# Patient Record
Sex: MALE | Race: BLACK OR AFRICAN AMERICAN | Employment: FULL TIME | URBAN - METROPOLITAN AREA
[De-identification: names, ages, dates, MRNs, and addresses within clinical notes are randomized per-mention and may not be internally consistent; named-entity substitution may affect disease eponyms.]

---

## 2018-07-09 ENCOUNTER — HOSPITAL ENCOUNTER (EMERGENCY)
Age: 33
Discharge: HOME | End: 2018-07-09
Payer: MEDICAID

## 2018-07-09 VITALS
DIASTOLIC BLOOD PRESSURE: 66 MMHG | BODY MASS INDEX: 20.28 KG/M2 | SYSTOLIC BLOOD PRESSURE: 114 MMHG | OXYGEN SATURATION: 97 % | WEIGHT: 158 LBS | RESPIRATION RATE: 18 BRPM | TEMPERATURE: 97 F | HEIGHT: 74 IN | HEART RATE: 78 BPM

## 2018-07-09 DIAGNOSIS — L02.412 ABSCESSES OF BOTH AXILLAE: Primary | ICD-10-CM

## 2018-07-09 DIAGNOSIS — L02.411 ABSCESSES OF BOTH AXILLAE: Primary | ICD-10-CM

## 2018-07-09 PROCEDURE — 99282 EMERGENCY DEPT VISIT SF MDM: CPT

## 2018-07-09 RX ORDER — NAPROXEN 500 MG/1
500 TABLET ORAL 2 TIMES DAILY
Qty: 20 TABLET | Refills: 0 | Status: SHIPPED | OUTPATIENT
Start: 2018-07-09 | End: 2019-11-24

## 2018-07-09 RX ORDER — DOXYCYCLINE HYCLATE 100 MG
100 TABLET ORAL 2 TIMES DAILY
Qty: 20 TABLET | Refills: 0 | Status: SHIPPED | OUTPATIENT
Start: 2018-07-09 | End: 2018-07-19

## 2018-07-09 ASSESSMENT — PAIN DESCRIPTION - ORIENTATION: ORIENTATION: OTHER (COMMENT)

## 2018-07-09 ASSESSMENT — PAIN DESCRIPTION - LOCATION: LOCATION: ARM

## 2018-07-09 ASSESSMENT — PAIN SCALES - GENERAL: PAINLEVEL_OUTOF10: 4

## 2018-07-09 ASSESSMENT — PAIN DESCRIPTION - PAIN TYPE: TYPE: ACUTE PAIN

## 2018-07-09 NOTE — ED PROVIDER NOTES
erythematous, swollen, or hot to touch. No drainage. Neurovascular: Motor deficit: none. Sensory deficit: none. Pulse deficit: none. Capillary refill: normal.  Lymphatics: No lymphangitis or adenopathy noted. Neurological:  Oriented. Motor functions intact. Lab / Imaging Results   (All laboratory and radiology results have been personally reviewed by myself)  Labs:  No results found for this visit on 07/09/18. Imaging: All Radiology results interpreted by Radiologist unless otherwise noted. No orders to display     ED Course / Medical Decision Making   Medications - No data to display     Re-examination:  7/9/18       Time: 1830   Patients symptoms are the same. Consult(s):   None    Procedure(s):   none    MDM:      Patient presents to the emergency department for multiple abscesses of the bilateral axillary. The patient was given the option to have some of the abscesses drained in the emergency department, but due to the small and nonfluctuant nature of the remaining abscesses I explained to him that it would not be in his best interest to have all of them incised. The patient adamantly refused to only have some, but not all, of his abscesses taken care of. He will, therefore, be given the contact information for Dr. Che Pinedo, general surgery, whom he should follow up with. Patient's vital signs are stable. He denies any pain or additional symptoms other than the \"fucking boils\" under his armpits. He'll be given the prescriptions listed below and should follow up with general surgery. Specific conditions for emergent return have been discussed and the patient verbalized understanding to return immediately for new or worsening symptoms. .    Counseling:        The emergency provider has spoken with the patient and discussed todays results, in addition to providing specific details for the plan of care and counseling regarding the diagnosis and prognosis. Questions are answered at this time and they are agreeable with the plan. Assessment      1. Abscesses of both axillae      Plan   Discharge to home  Patient condition is good    New Medications     Discharge Medication List as of 7/9/2018  6:37 PM      START taking these medications    Details   doxycycline hyclate (VIBRA-TABS) 100 MG tablet Take 1 tablet by mouth 2 times daily for 10 days, Disp-20 tablet, R-0Print      naproxen (NAPROSYN) 500 MG tablet Take 1 tablet by mouth 2 times daily for 10 days, Disp-20 tablet, R-0Print           Electronically signed by Armando Mccurdy PA-C   DD: 7/9/18  **This report was transcribed using voice recognition software. Every effort was made to ensure accuracy; however, inadvertent computerized transcription errors may be present.   END OF ED PROVIDER NOTE      Armando Mccurdy PA-C  07/09/18 [id number]

## 2019-11-24 ENCOUNTER — HOSPITAL ENCOUNTER (EMERGENCY)
Age: 34
Discharge: HOME | End: 2019-11-24
Attending: FAMILY MEDICINE

## 2019-11-24 ENCOUNTER — APPOINTMENT (OUTPATIENT)
Dept: GENERAL RADIOLOGY | Age: 34
End: 2019-11-24

## 2019-11-24 VITALS
SYSTOLIC BLOOD PRESSURE: 136 MMHG | WEIGHT: 163 LBS | RESPIRATION RATE: 18 BRPM | HEART RATE: 87 BPM | TEMPERATURE: 98.3 F | HEIGHT: 74 IN | OXYGEN SATURATION: 95 % | BODY MASS INDEX: 20.92 KG/M2 | DIASTOLIC BLOOD PRESSURE: 88 MMHG

## 2019-11-24 DIAGNOSIS — J06.9 VIRAL URI WITH COUGH: Primary | ICD-10-CM

## 2019-11-24 LAB
INFLUENZA A BY PCR: NOT DETECTED
INFLUENZA B BY PCR: NOT DETECTED

## 2019-11-24 PROCEDURE — 6370000000 HC RX 637 (ALT 250 FOR IP)

## 2019-11-24 PROCEDURE — 71046 X-RAY EXAM CHEST 2 VIEWS: CPT

## 2019-11-24 PROCEDURE — 99283 EMERGENCY DEPT VISIT LOW MDM: CPT

## 2019-11-24 PROCEDURE — 87502 INFLUENZA DNA AMP PROBE: CPT

## 2019-11-24 RX ORDER — ONDANSETRON 4 MG/1
4 TABLET, ORALLY DISINTEGRATING ORAL ONCE
Status: COMPLETED | OUTPATIENT
Start: 2019-11-24 | End: 2019-11-24

## 2019-11-24 RX ORDER — BROMPHENIRAMINE MALEATE, PSEUDOEPHEDRINE HYDROCHLORIDE, AND DEXTROMETHORPHAN HYDROBROMIDE 2; 30; 10 MG/5ML; MG/5ML; MG/5ML
5 SYRUP ORAL 4 TIMES DAILY PRN
Qty: 120 ML | Refills: 0 | Status: SHIPPED | OUTPATIENT
Start: 2019-11-24 | End: 2021-12-02

## 2019-11-24 RX ORDER — ONDANSETRON 4 MG/1
TABLET, ORALLY DISINTEGRATING ORAL
Status: COMPLETED
Start: 2019-11-24 | End: 2019-11-24

## 2019-11-24 RX ORDER — ONDANSETRON 4 MG/1
4 TABLET, ORALLY DISINTEGRATING ORAL EVERY 8 HOURS PRN
Qty: 10 TABLET | Refills: 0 | Status: SHIPPED | OUTPATIENT
Start: 2019-11-24 | End: 2020-11-23

## 2019-11-24 RX ADMIN — ONDANSETRON 4 MG: 4 TABLET, ORALLY DISINTEGRATING ORAL at 08:47

## 2019-12-02 ENCOUNTER — HOSPITAL ENCOUNTER (EMERGENCY)
Age: 34
Discharge: HOME | End: 2019-12-02
Attending: EMERGENCY MEDICINE

## 2019-12-02 VITALS
RESPIRATION RATE: 18 BRPM | BODY MASS INDEX: 21.17 KG/M2 | HEART RATE: 71 BPM | HEIGHT: 74 IN | OXYGEN SATURATION: 99 % | TEMPERATURE: 97.9 F | WEIGHT: 165 LBS | SYSTOLIC BLOOD PRESSURE: 116 MMHG | DIASTOLIC BLOOD PRESSURE: 82 MMHG

## 2019-12-02 DIAGNOSIS — R53.83 OTHER FATIGUE: Primary | ICD-10-CM

## 2019-12-02 PROCEDURE — 99283 EMERGENCY DEPT VISIT LOW MDM: CPT

## 2021-02-24 ENCOUNTER — HOSPITAL ENCOUNTER (EMERGENCY)
Age: 36
Discharge: HOME | End: 2021-02-24

## 2021-02-24 VITALS
BODY MASS INDEX: 20.53 KG/M2 | HEART RATE: 82 BPM | TEMPERATURE: 97 F | WEIGHT: 160 LBS | HEIGHT: 74 IN | DIASTOLIC BLOOD PRESSURE: 83 MMHG | RESPIRATION RATE: 16 BRPM | OXYGEN SATURATION: 98 % | SYSTOLIC BLOOD PRESSURE: 132 MMHG

## 2021-02-24 DIAGNOSIS — L03.211 FACIAL CELLULITIS: Primary | ICD-10-CM

## 2021-02-24 PROCEDURE — 99284 EMERGENCY DEPT VISIT MOD MDM: CPT

## 2021-02-24 PROCEDURE — 90471 IMMUNIZATION ADMIN: CPT | Performed by: NURSE PRACTITIONER

## 2021-02-24 PROCEDURE — 90715 TDAP VACCINE 7 YRS/> IM: CPT | Performed by: NURSE PRACTITIONER

## 2021-02-24 PROCEDURE — 6360000002 HC RX W HCPCS: Performed by: NURSE PRACTITIONER

## 2021-02-24 PROCEDURE — 6370000000 HC RX 637 (ALT 250 FOR IP): Performed by: NURSE PRACTITIONER

## 2021-02-24 RX ORDER — IBUPROFEN 800 MG/1
800 TABLET ORAL EVERY 8 HOURS PRN
Qty: 30 TABLET | Refills: 0 | Status: SHIPPED | OUTPATIENT
Start: 2021-02-24 | End: 2021-12-02

## 2021-02-24 RX ORDER — DOXYCYCLINE HYCLATE 100 MG
100 TABLET ORAL 2 TIMES DAILY
Qty: 20 TABLET | Refills: 0 | Status: SHIPPED | OUTPATIENT
Start: 2021-02-24 | End: 2021-03-06

## 2021-02-24 RX ORDER — IBUPROFEN 800 MG/1
800 TABLET ORAL ONCE
Status: COMPLETED | OUTPATIENT
Start: 2021-02-24 | End: 2021-02-24

## 2021-02-24 RX ORDER — DOXYCYCLINE HYCLATE 100 MG/1
100 CAPSULE ORAL ONCE
Status: COMPLETED | OUTPATIENT
Start: 2021-02-24 | End: 2021-02-24

## 2021-02-24 RX ADMIN — TETANUS TOXOID, REDUCED DIPHTHERIA TOXOID AND ACELLULAR PERTUSSIS VACCINE, ADSORBED 0.5 ML: 5; 2.5; 8; 8; 2.5 SUSPENSION INTRAMUSCULAR at 15:12

## 2021-02-24 RX ADMIN — DOXYCYCLINE HYCLATE 100 MG: 100 CAPSULE ORAL at 15:12

## 2021-02-24 RX ADMIN — IBUPROFEN 800 MG: 800 TABLET, FILM COATED ORAL at 15:12

## 2021-02-25 NOTE — ED PROVIDER NOTES
[address]  Department of Emergency Medicine   ED  Encounter Note  Admit Date/RoomTime: 2021  3:03 PM  ED Room: [facility]    NAME: Ken Crowell  : 1985  MRN: [id number]     Chief Complaint:  Facial Swelling (R sided facial swelling, feels like he got bit by something 2 days ago, denies any issues swallowing, denies fever)    History of Present Illness        Ken Crowell is a 38 y.o. old male who presents to the emergency department by private vehicle, for non-traumatic right face pain which occured 2 day(s) prior to arrival.  Mechanism of pain/injury: possible insect bite, and is associated with surrounding facial swelling. Loss of consciousness: No.  Previous facial injury: no.  Since onset the symptoms have been stable. His pain is aggraveated by palpation and relieved by nothing. There has been no fever or chills, difficulty breathing, swallowing or visual disturbance or decrease ROM of eye. He takes no blood thinning agents. Last tetanus unknown. ROS   Pertinent positives and negatives are stated within HPI, all other systems reviewed and are negative. Past Medical History:  has no past medical history on file. Surgical History:  has a past surgical history that includes Eye surgery and Hemorrhoid surgery. Social History:  reports that he has been smoking cigarettes. He has been smoking about 1.00 pack per day. He has never used smokeless tobacco. He reports current alcohol use. He reports current drug use. Drug: Marijuana. Family History: family history is not on file. Allergies: Patient has no known allergies.     Physical Exam   Oxygen Saturation Interpretation: Normal.        ED Triage Vitals   BP Temp Temp src Pulse Resp SpO2 Height Weight   21 1500 21 1451 -- 21 1451 21 1500 21 1451 21 1500 21 1500   132/83 97 °F (36.1 °C)  82 16 98 % 6' 2\" (1.88 m) 160 lb (72.6 kg)         Constitutional: Alert.  Development consistent with age. Head:  Atraumatic, without temporal or scalp tenderness. Eyes:  PERRL, EOMI. No periorbital ecchymoses. Conjunctiva: normal.  Ears:  External ears without lesions. Face:        Periorbital:  Bilateral no swelling, ecchymosis, tendeness. Zygoma:  Right swelling and tenderness. Maxilla:  Bilateral no swelling, tendeness or deformity. Mandible:  Bilateral no swelling, tendeness or deformity. Facial Skin:  no erythema, rash or swelling noted. Sinuses:  no bilateral maxillary sinus tenderness. no bilateral frontal sinus tenderness. Nose:  There is no swelling, pain or deformity present. Skin: normal.    Intranasal:   Blood: no..   Throat:  Pharynx without lesions. Airway patient. Neck:  Supple. Nontender. Chest:  Symmetrical without visible rash or tenderness. Respiratory:  Clear to auscultation and breath sounds equal.  CV:  Regular rate and rhythm, normal heart sounds, without pathological murmurs. GI: Abdomen Soft, nontender. No abrasions, ecchymoses, or lacerations. Back:  No costovertebral, paravertebral, intervertebral, or vertebral tenderness or spasm. Pelvis: non tender and stable to palpation. Integument:  No abrasions, ecchymoses, or lacerations unless noted elsewhere. Musculoskeletal:  No tenderness or swelling. Normal, painless range of motion. No neurovascular deficit. Lymphatic: no lymphadenopathy noted  Neurological:  Orientation age-appropriate. Motor functions intact. Lab / Imaging Results   (All laboratory and radiology results have been personally reviewed by myself)  Labs:  No results found for this visit on 02/24/21. Imaging: All Radiology results interpreted by Radiologist unless otherwise noted.   No orders to display     ED Course / Medical Decision Making     Medications   ibuprofen (ADVIL;MOTRIN) tablet 800 mg (800 mg Oral Given 2/24/21 1512)   doxycycline hyclate (VIBRAMYCIN) capsule 100 mg (100 mg Oral Given 2/24/21 1512)   Tetanus-Diphth-Acell Pertussis (BOOSTRIX) injection 0.5 mL (0.5 mLs Intramuscular Given 2/24/21 1512)         Consult(s):   None    Procedure(s):   none    MDM:   Patient is well-appearing, afebrile. Presents with erythema warmth and edema to right side of face x2 days. Tetanus updated. No visual disturbances decreased range of motion of eye fevers or chills. No underlying fluctuance to warrant incision and drainage at this time. Plan is for symptom control, antibiotic, warm compresses several times a day and outpatient follow-up with PCP for recheck. Educated on signs and symptoms which require emergent evaluation. Plan of Care/Counseling:  Myself reviewed today's visit with the patient in addition to providing specific details for the plan of care and counseling regarding the diagnosis and prognosis. Questions are answered at this time and are agreeable with the plan. Assessment      1. Facial cellulitis      Plan   Discharge home. Patient condition is good    New Medications     Discharge Medication List as of 2/24/2021  3:33 PM      START taking these medications    Details   ibuprofen (IBU) 800 MG tablet Take 1 tablet by mouth every 8 hours as needed for Pain or Fever Take with food. , Disp-30 tablet, R-0Print      doxycycline hyclate (VIBRA-TABS) 100 MG tablet Take 1 tablet by mouth 2 times daily for 10 days, Disp-20 tablet, R-0Print           Electronically signed by BISMARK Flynn CNP   DD: 2/25/21  **This report was transcribed using voice recognition software. Every effort was made to ensure accuracy; however, inadvertent computerized transcription errors may be present.   END OF ED PROVIDER NOTE      BISMARK Richardson CNP  02/25/21 8622

## 2021-12-02 ENCOUNTER — HOSPITAL ENCOUNTER (EMERGENCY)
Age: 36
Discharge: HOME | End: 2021-12-02

## 2021-12-02 VITALS
OXYGEN SATURATION: 98 % | RESPIRATION RATE: 16 BRPM | DIASTOLIC BLOOD PRESSURE: 99 MMHG | TEMPERATURE: 98.3 F | HEART RATE: 84 BPM | SYSTOLIC BLOOD PRESSURE: 148 MMHG | HEIGHT: 73 IN | WEIGHT: 160 LBS | BODY MASS INDEX: 21.2 KG/M2

## 2021-12-02 DIAGNOSIS — Z20.2 POSSIBLE EXPOSURE TO STD: Primary | ICD-10-CM

## 2021-12-02 LAB
BACTERIA: ABNORMAL /HPF
BILIRUBIN URINE: NEGATIVE
BLOOD, URINE: ABNORMAL
CLARITY: ABNORMAL
COLOR: YELLOW
EPITHELIAL CELLS, UA: ABNORMAL /HPF
GLUCOSE URINE: NEGATIVE MG/DL
KETONES, URINE: NEGATIVE MG/DL
LEUKOCYTE ESTERASE, URINE: ABNORMAL
NITRITE, URINE: NEGATIVE
PH UA: 7 (ref 5–9)
PROTEIN UA: ABNORMAL MG/DL
RBC UA: >20 /HPF (ref 0–2)
SPECIFIC GRAVITY UA: 1.02 (ref 1–1.03)
UROBILINOGEN, URINE: 1 E.U./DL
WBC UA: >20 /HPF (ref 0–5)

## 2021-12-02 PROCEDURE — 99283 EMERGENCY DEPT VISIT LOW MDM: CPT

## 2021-12-02 PROCEDURE — 96372 THER/PROPH/DIAG INJ SC/IM: CPT

## 2021-12-02 PROCEDURE — 87591 N.GONORRHOEAE DNA AMP PROB: CPT

## 2021-12-02 PROCEDURE — 6360000002 HC RX W HCPCS: Performed by: NURSE PRACTITIONER

## 2021-12-02 PROCEDURE — 87491 CHLMYD TRACH DNA AMP PROBE: CPT

## 2021-12-02 PROCEDURE — 81001 URINALYSIS AUTO W/SCOPE: CPT

## 2021-12-02 PROCEDURE — 6370000000 HC RX 637 (ALT 250 FOR IP): Performed by: NURSE PRACTITIONER

## 2021-12-02 RX ORDER — AZITHROMYCIN 250 MG/1
1000 TABLET, FILM COATED ORAL ONCE
Status: COMPLETED | OUTPATIENT
Start: 2021-12-02 | End: 2021-12-02

## 2021-12-02 RX ORDER — CEFTRIAXONE 1 G/1
500 INJECTION, POWDER, FOR SOLUTION INTRAMUSCULAR; INTRAVENOUS ONCE
Status: COMPLETED | OUTPATIENT
Start: 2021-12-02 | End: 2021-12-02

## 2021-12-02 RX ADMIN — AZITHROMYCIN 1000 MG: 250 TABLET, FILM COATED ORAL at 04:53

## 2021-12-02 RX ADMIN — CEFTRIAXONE 500 MG: 1 INJECTION, POWDER, FOR SOLUTION INTRAMUSCULAR; INTRAVENOUS at 04:53

## 2021-12-02 NOTE — ED PROVIDER NOTES
Independent  HPI:  12/2/21, Time: 3:54 AM CHINA Rodriguez is a 39 y.o. male presenting to the ED for concerns regarding STD. Patient reports that he had sexual intercourse approximately 1 week ago states that the sexual intercourse was rough and he felt like the tip of his penis became irritated and reports that it appears swollen he is uncircumcised and states that he has been putting A&E and peroxide to the skin. He states that now 2 days ago he began to have yellow drainage from the penis as well as burning with urination. Patient otherwise denies fevers he denies any abdominal pain as well as no noted back or flank pain. Patient reports otherwise in normal state of health. He denies any chest pain, shortness of breath, nausea, vomiting or diarrhea. Patient also does not have any vesicle formations noted. Symptoms mild in severity and persistent    Review of Systems:   A complete review of systems was performed and pertinent positives and negatives are stated within HPI, all other systems reviewed and are negative.          --------------------------------------------- PAST HISTORY ---------------------------------------------  Past Medical History:  has no past medical history on file. Past Surgical History:  has a past surgical history that includes Eye surgery and Hemorrhoid surgery. Social History:  reports that he has been smoking cigarettes. He has been smoking about 1.00 pack per day. He has never used smokeless tobacco. He reports current alcohol use. He reports current drug use. Drug: Marijuana Champ Cue). Family History: family history is not on file. The patients home medications have been reviewed. Allergies: Patient has no known allergies.     -------------------------------------------------- RESULTS -------------------------------------------------  All laboratory and radiology results have been personally reviewed by myself   LABS:  Results for orders placed or performed during the hospital encounter of 12/02/21   C.trachomatis N.gonorrhoeae DNA, Urine    Specimen: Urine   Result Value Ref Range    Source Urine    Urinalysis   Result Value Ref Range    Color, UA Yellow Straw/Yellow    Clarity, UA CLOUDY (A) Clear    Glucose, Ur Negative Negative mg/dL    Bilirubin Urine Negative Negative    Ketones, Urine Negative Negative mg/dL    Specific Gravity, UA 1.025 1.005 - 1.030    Blood, Urine SMALL (A) Negative    pH, UA 7.0 5.0 - 9.0    Protein, UA TRACE Negative mg/dL    Urobilinogen, Urine 1.0 <2.0 E.U./dL    Nitrite, Urine Negative Negative    Leukocyte Esterase, Urine MODERATE (A) Negative   Microscopic Urinalysis   Result Value Ref Range    WBC, UA >20 (A) 0 - 5 /HPF    RBC, UA >20 0 - 2 /HPF    Epithelial Cells, UA MODERATE /HPF    Bacteria, UA FEW (A) None Seen /HPF       RADIOLOGY:  Interpreted by Radiologist.  No orders to display       ------------------------- NURSING NOTES AND VITALS REVIEWED ---------------------------   The nursing notes within the ED encounter and vital signs as below have been reviewed. BP (!) 148/99   Pulse 84   Temp 98.3 °F (36.8 °C) (Oral)   Resp 16   Ht 6' 1\" (1.854 m)   Wt 160 lb (72.6 kg)   SpO2 98%   BMI 21.11 kg/m²   Oxygen Saturation Interpretation: Normal      ---------------------------------------------------PHYSICAL EXAM--------------------------------------      Constitutional/General: Alert and oriented x3, well appearing, non toxic in NAD  Head: Normocephalic and atraumatic  Eyes: PERRL, EOMI  Mouth: Oropharynx clear, handling secretions, no trismus  Neck: Supple, full ROM,   Pulmonary: Lungs clear to auscultation bilaterally, no wheezes, rales, or rhonchi. Not in respiratory distress  Cardiovascular:  Regular rate and rhythm, no murmurs, gallops, or rubs. 2+ distal pulses  Abdomen: Soft, non tender, non distended,   Extremities: Moves all extremities x 4.  Warm and well perfused, negative for CVA tenderness no point tenderness to the abdomen. On examination patient is an uncircumcised male. He does have slight skin irritation representing an abrasion to the tissue but there is no vesicle formation noted he did not have any active penile discharge noted. Patient reports that he is been using A&E ointment as well as peroxide. Skin: warm and dry without rash  Neurologic: GCS 15,  Psych: Normal Affect      ------------------------------ ED COURSE/MEDICAL DECISION MAKING----------------------  Medications   cefTRIAXone (ROCEPHIN) injection 500 mg (500 mg IntraMUSCular Given 12/2/21 0453)   azithromycin (ZITHROMAX) tablet 1,000 mg (1,000 mg Oral Given 12/2/21 0453)         ED COURSE:       Medical Decision Making:    Plan will be for urinalysis, will also empirically treat patient with Rocephin 500 mg IM injection as well as Zithromax 1 g orally. Patient educated on the proper use of A&E ointment as well as to avoid peroxide as it does traumatize healthy tissue. Patient was empirically treated with Rocephin and Zithromax. Initial urinalysis is showing leukocytes moderate as well as greater than 20 WBCs. Patient most likely does have a STD as this is concerning. Gonorrhea and Chlamydia results will be pending. Patient educated on safe sex practices, the importance of wearing a condom as well as avoiding sexual intercourse now for the next 2 weeks. He was also educated that he must follow-up with results and if he is positive he must inform his sexual partner. Patient otherwise made aware to follow-up with his primary care doctor as well as when to return. Patient expressed understanding and safely discharged home    Counseling: The emergency provider has spoken with the patient and discussed todays results, in addition to providing specific details for the plan of care and counseling regarding the diagnosis and prognosis.   Questions are answered at this time and they are agreeable with the plan.      --------------------------------- IMPRESSION AND DISPOSITION ---------------------------------    IMPRESSION  1. Possible exposure to STD        DISPOSITION  Disposition: Discharge to home  Patient condition is good      NOTE: This report was transcribed using voice recognition software.  Every effort was made to ensure accuracy; however, inadvertent computerized transcription errors may be present     BISMARK Amato - CNP  12/02/21 9562

## 2021-12-02 NOTE — ED NOTES
Patient given urine specimen container at this time and verbalized understanding of need      William Marks RN  12/02/21 9373

## 2021-12-07 LAB
C. TRACHOMATIS DNA ,URINE: NEGATIVE
N. GONORRHOEAE DNA, URINE: ABNORMAL
SOURCE: ABNORMAL

## 2022-03-15 ENCOUNTER — HOSPITAL ENCOUNTER (EMERGENCY)
Age: 37
Discharge: HOME | End: 2022-03-15

## 2022-03-15 VITALS
HEART RATE: 79 BPM | TEMPERATURE: 97.9 F | RESPIRATION RATE: 18 BRPM | DIASTOLIC BLOOD PRESSURE: 95 MMHG | WEIGHT: 160 LBS | SYSTOLIC BLOOD PRESSURE: 140 MMHG | BODY MASS INDEX: 21.11 KG/M2 | OXYGEN SATURATION: 98 %

## 2022-03-15 DIAGNOSIS — Z02.89 ENCOUNTER TO OBTAIN EXCUSE FROM WORK: Primary | ICD-10-CM

## 2022-03-15 PROCEDURE — 99283 EMERGENCY DEPT VISIT LOW MDM: CPT

## 2022-03-15 PROCEDURE — 99282 EMERGENCY DEPT VISIT SF MDM: CPT

## 2022-03-15 NOTE — ED PROVIDER NOTES
[address] [place] [address]  Department of Emergency Medicine   ED  Encounter Note  Admit Date/RoomTime: 3/15/2022 10:34 AM  ED Room: CHAIR01/    NAME: Gera Martinez  : 1985  MRN: [id number]     Chief Complaint:  Other (wants a work excuse for \" to  because he lost his job and wants it back\"  was never seen here, has absolutely no complaints today )    HISTORY OF PRESENT ILLNESS        Gera Martinez is a 44 y.o. male who presents to the ED by private vehicle for work excuse. Pt states he missed work from -. Pt states he had a \"sickness\". Patient would not elaborate on this. Patient states he was fired from his job so he came here to get a work excuse for those dates. Patient denies any complaints at this time. Denies fever/chills, headache, vision change, dizziness, cough, sore throat, chest pain, dyspnea, abdominal pain, NVD, numbness/weakness. ROS   Pertinent positives and negatives are stated within HPI, all other systems reviewed and are negative. Past Medical History:  has no past medical history on file. Surgical History:  has a past surgical history that includes Eye surgery and Hemorrhoid surgery. Social History:  reports that he has been smoking cigarettes. He has been smoking about 1.00 pack per day. He has never used smokeless tobacco. He reports current alcohol use. He reports current drug use. Drug: Marijuana Don Safer). Family History: family history is not on file. Allergies: Patient has no known allergies. PHYSICAL EXAM   Oxygen Saturation Interpretation: Normal on room air analysis.         ED Triage Vitals   BP Temp Temp Source Pulse Resp SpO2 Height Weight   03/15/22 1032 03/15/22 1024 03/15/22 1024 03/15/22 1024 03/15/22 1032 03/15/22 1024 -- 03/15/22 1032   (!) 140/95 97.9 °F (36.6 °C) Oral 79 18 96 %  160 lb (72.6 kg)         Physical Exam  Constitutional/General: Alert and oriented x3, well appearing, non toxic. HEENT:  NC/NT. PERRLA,  Airway patent. Neck: Supple, full ROM, non tender to palpation in the midline, no stridor, no crepitus, no meningeal signs  Respiratory: Lungs clear to auscultation bilaterally, no wheezes, rales, or rhonchi. Not in respiratory distress  CV:  Regular rate. Regular rhythm. No murmurs, gallops, or rubs. 2+ distal pulses  Chest: No chest wall tenderness  GI:  Abdomen Soft, Non tender, Non distended. +BS. No rebound, guarding, or rigidity. No pulsatile masses. Musculoskeletal: Moves all extremities x 4. Warm and well perfused, no clubbing, cyanosis, or edema. Capillary refill <3 seconds  Integument: skin warm and dry. No rashes. Lymphatic: no lymphadenopathy noted  Neurologic: GCS 15, no focal deficits, symmetric strength 5/5 in the upper and lower extremities bilaterally  Psychiatric: Normal Affect      Lab / Imaging Results   (All laboratory and radiology results have been personally reviewed by myself)  Labs:  No results found for this visit on 03/15/22. Imaging: All Radiology results interpreted by Radiologist unless otherwise noted. No orders to display       ED Course / Medical Decision Making   Medications - No data to display         Consultations:             None    Procedures:   none    MDM: Patient presenting for work excuse. Patient is in no acute distress, afebrile, nontoxic appearance. Patient has no complaints at this time. Discussed with patient that I could not give him a note to cover the dates that he was off but could put that he could return to work today since he is no longer sick. Patient to follow-up with PCP. Recommend patient return to the ED with new or worsening symptoms. Plan of Care/Counseling:  DEANNA Salcedo reviewed today's visit with the patient in addition to providing specific details for the plan of care and counseling regarding the diagnosis and prognosis.   Questions are answered at this time and are agreeable with the plan. ASSESSMENT     1. Encounter to obtain excuse from work      This patient's ED course included: a personal history and physicial examination  This patient has remained hemodynamically stable during their ED course. PLAN   Discharged home. Patient condition is stable. New Medications   There are no discharge medications for this patient. Electronically signed by Christin Mathews PA-C   DD: 3/15/22  **This report was transcribed using voice recognition software. Every effort was made to ensure accuracy; however, inadvertent computerized transcription errors may be present.   Yusef Purvis PA-C  03/15/22 1512

## 2022-03-15 NOTE — Clinical Note
Maryann Lyles was seen and treated in our emergency department on 3/15/2022.     Patient can return to work on 3/15/22    Marvin Herbert PA-C

## 2023-07-01 ENCOUNTER — HOSPITAL ENCOUNTER (EMERGENCY)
Age: 38
Discharge: HOME | End: 2023-07-02
Attending: EMERGENCY MEDICINE

## 2023-07-01 DIAGNOSIS — F19.90 SUBSTANCE USE: Primary | ICD-10-CM

## 2023-07-01 DIAGNOSIS — F19.10 POLYSUBSTANCE ABUSE (HCC): ICD-10-CM

## 2023-07-01 PROCEDURE — 99283 EMERGENCY DEPT VISIT LOW MDM: CPT

## 2023-07-02 VITALS
SYSTOLIC BLOOD PRESSURE: 130 MMHG | HEART RATE: 82 BPM | HEIGHT: 73 IN | RESPIRATION RATE: 12 BRPM | DIASTOLIC BLOOD PRESSURE: 69 MMHG | OXYGEN SATURATION: 98 % | WEIGHT: 150 LBS | BODY MASS INDEX: 19.88 KG/M2 | TEMPERATURE: 98.3 F

## 2023-07-02 ASSESSMENT — PAIN - FUNCTIONAL ASSESSMENT: PAIN_FUNCTIONAL_ASSESSMENT: NONE - DENIES PAIN

## 2023-08-29 ENCOUNTER — HOSPITAL ENCOUNTER (EMERGENCY)
Age: 38
Discharge: HOME | End: 2023-08-29

## 2023-08-29 VITALS
TEMPERATURE: 97.5 F | RESPIRATION RATE: 18 BRPM | SYSTOLIC BLOOD PRESSURE: 118 MMHG | HEART RATE: 65 BPM | OXYGEN SATURATION: 98 % | DIASTOLIC BLOOD PRESSURE: 89 MMHG

## 2023-08-29 DIAGNOSIS — K02.9 DENTAL DECAY: ICD-10-CM

## 2023-08-29 DIAGNOSIS — K08.89 PAIN, DENTAL: Primary | ICD-10-CM

## 2023-08-29 DIAGNOSIS — S02.5XXA CLOSED FRACTURE OF TOOTH, INITIAL ENCOUNTER: ICD-10-CM

## 2023-08-29 PROCEDURE — 6370000000 HC RX 637 (ALT 250 FOR IP): Performed by: NURSE PRACTITIONER

## 2023-08-29 PROCEDURE — 99283 EMERGENCY DEPT VISIT LOW MDM: CPT

## 2023-08-29 RX ORDER — LIDOCAINE HYDROCHLORIDE 20 MG/ML
15 SOLUTION OROPHARYNGEAL
Qty: 100 ML | Refills: 0 | Status: SHIPPED | OUTPATIENT
Start: 2023-08-29

## 2023-08-29 RX ORDER — AMOXICILLIN 250 MG/1
500 CAPSULE ORAL ONCE
Status: COMPLETED | OUTPATIENT
Start: 2023-08-29 | End: 2023-08-29

## 2023-08-29 RX ORDER — IBUPROFEN 800 MG/1
800 TABLET ORAL ONCE
Status: COMPLETED | OUTPATIENT
Start: 2023-08-29 | End: 2023-08-29

## 2023-08-29 RX ORDER — LIDOCAINE HYDROCHLORIDE 20 MG/ML
15 SOLUTION OROPHARYNGEAL ONCE
Status: COMPLETED | OUTPATIENT
Start: 2023-08-29 | End: 2023-08-29

## 2023-08-29 RX ORDER — IBUPROFEN 800 MG/1
800 TABLET ORAL EVERY 8 HOURS PRN
Qty: 21 TABLET | Refills: 0 | Status: SHIPPED | OUTPATIENT
Start: 2023-08-29 | End: 2023-09-05

## 2023-08-29 RX ORDER — AMOXICILLIN 500 MG/1
500 CAPSULE ORAL 3 TIMES DAILY
Qty: 21 CAPSULE | Refills: 0 | Status: SHIPPED | OUTPATIENT
Start: 2023-08-29 | End: 2023-09-05

## 2023-08-29 RX ADMIN — AMOXICILLIN 500 MG: 250 CAPSULE ORAL at 19:41

## 2023-08-29 RX ADMIN — Medication 15 ML: at 19:42

## 2023-08-29 RX ADMIN — IBUPROFEN 800 MG: 800 TABLET, FILM COATED ORAL at 19:41

## 2023-08-29 NOTE — DISCHARGE INSTRUCTIONS
Take medication as prescribed, please follow-up with dental clinic, it is open Monday through Friday at 8 AM, no appointment needed just walk-in but be prepared to wait. It is first come first serve in between regular appointments.

## 2023-09-22 ENCOUNTER — HOSPITAL ENCOUNTER (EMERGENCY)
Age: 38
Discharge: HOME | End: 2023-09-22

## 2023-09-22 VITALS
OXYGEN SATURATION: 99 % | HEART RATE: 80 BPM | TEMPERATURE: 98.2 F | DIASTOLIC BLOOD PRESSURE: 89 MMHG | SYSTOLIC BLOOD PRESSURE: 161 MMHG

## 2023-09-22 DIAGNOSIS — J06.9 ACUTE UPPER RESPIRATORY INFECTION: Primary | ICD-10-CM

## 2023-09-22 LAB
SARS-COV-2 RDRP RESP QL NAA+PROBE: NOT DETECTED
SPECIMEN DESCRIPTION: NORMAL

## 2023-09-22 PROCEDURE — 87635 SARS-COV-2 COVID-19 AMP PRB: CPT

## 2023-09-22 PROCEDURE — 99283 EMERGENCY DEPT VISIT LOW MDM: CPT

## 2024-02-20 ENCOUNTER — APPOINTMENT (OUTPATIENT)
Dept: GENERAL RADIOLOGY | Age: 39
End: 2024-02-20

## 2024-02-20 ENCOUNTER — HOSPITAL ENCOUNTER (EMERGENCY)
Age: 39
Discharge: HOME | End: 2024-02-20

## 2024-02-20 VITALS
BODY MASS INDEX: 20.85 KG/M2 | DIASTOLIC BLOOD PRESSURE: 83 MMHG | OXYGEN SATURATION: 98 % | HEART RATE: 88 BPM | RESPIRATION RATE: 18 BRPM | WEIGHT: 158 LBS | TEMPERATURE: 97.4 F | SYSTOLIC BLOOD PRESSURE: 145 MMHG

## 2024-02-20 DIAGNOSIS — S62.339A CLOSED BOXER'S FRACTURE, INITIAL ENCOUNTER: Primary | ICD-10-CM

## 2024-02-20 PROCEDURE — 73130 X-RAY EXAM OF HAND: CPT

## 2024-02-20 PROCEDURE — 99283 EMERGENCY DEPT VISIT LOW MDM: CPT

## 2024-02-20 RX ORDER — IBUPROFEN 800 MG/1
800 TABLET ORAL EVERY 8 HOURS PRN
Qty: 21 TABLET | Refills: 0 | Status: SHIPPED | OUTPATIENT
Start: 2024-02-20 | End: 2024-02-27

## 2024-02-20 ASSESSMENT — LIFESTYLE VARIABLES
HOW MANY STANDARD DRINKS CONTAINING ALCOHOL DO YOU HAVE ON A TYPICAL DAY: PATIENT DOES NOT DRINK
HOW OFTEN DO YOU HAVE A DRINK CONTAINING ALCOHOL: NEVER

## 2024-02-20 ASSESSMENT — PAIN - FUNCTIONAL ASSESSMENT: PAIN_FUNCTIONAL_ASSESSMENT: 0-10

## 2024-02-20 ASSESSMENT — PAIN SCALES - GENERAL
PAINLEVEL_OUTOF10: 7
PAINLEVEL_OUTOF10: 3

## 2024-02-20 NOTE — ED PROVIDER NOTES
Independent GABBY Visit.     Department of Emergency Medicine   ED  Encounter Note  Admit Date/RoomTime: No admission date for patient encounter.  ED Room: Room/bed info not found    NAME: Mega Grove  : 1985  MRN: [id number]     Chief Complaint:  Hand Pain (Punched a fridge 4 days ago )    History of Present Illness       Mega Grove is a 38 y.o. old male presenting to the emergency department by private vehicle, for traumatic Left hand swelling which occured 4 day(s) prior to arrival.  The complaint is due to punching a refrigerator.  He is right handed. Patient has no prior history of pain/injury with regards to today's visit.  Since onset the symptoms have been remaining constant.  His symptoms are aggraveated by bending his 5th finger and relieved by ice and rest of injured area.     ROS   Pertinent positives and negatives are stated within HPI, all other systems reviewed and are negative.    Past Medical History:  has no past medical history on file.    Surgical History:  has a past surgical history that includes Eye surgery and Hemorrhoid surgery.    Social History:  reports that he has been smoking cigarettes. He has never used smokeless tobacco. He reports current alcohol use. He reports current drug use. Drug: Marijuana (Weed).    Family History: family history is not on file.     Allergies: Patient has no known allergies.    Physical Exam   Oxygen Saturation Interpretation: Normal.        ED Triage Vitals [24 1452]   BP Temp Temp src Pulse Resp SpO2 Height Weight   -- 97.4 °F (36.3 °C) -- (!) 102 -- 98 % -- --       Constitutional:  Alert, development consistent with age.  Neck:  Normal ROM.  Supple. Non-tender.  Physical Exam  Hand: Left dorsal 5th distal aspect  Metacarpal .              Tenderness: moderate.              Swelling: Moderate.             Deformity: no deformity observed/palpated.                Skin:  swelling and ttp. No open wounds or drainage.       Neurovascular:

## 2024-02-21 NOTE — CONSULTS
Department of Orthopedic Surgery  Resident Consult Note    Reason for Consult:  left hand fracture    HISTORY OF PRESENT ILLNESS:       Patient is a 38 y.o. right-hand-dominant male who presents with left hand pain.  Patient states he punched a refrigerator earlier today causing left hand pain.  Reports prior history of right fifth metacarpal fracture while in high school.  Denies numbness/tingling/paresthesias.  Denies any other orthopedic complaints at this time.      Past Medical History:    No past medical history on file.  Past Surgical History:        Procedure Laterality Date    EYE SURGERY      LT    HEMORRHOID SURGERY       Current Medications:   No current facility-administered medications for this encounter.  Allergies:  Patient has no known allergies.    Social History:   TOBACCO:   reports that he has been smoking cigarettes. He has never used smokeless tobacco.  ETOH:   reports current alcohol use.  DRUGS:   reports current drug use. Drug: Marijuana (Weed).    Family History:   No family history on file.    REVIEW OF SYSTEMS:  CONSTITUTIONAL:  negative for  fevers, chills  EYES:  negative for blurred vision, visual disturbance  HEENT:  negative for  hearing loss, voice change  RESPIRATORY:  negative for  dyspnea, wheezing  CARDIOVASCULAR:  negative for  chest pain, palpitations  GASTROINTESTINAL:  negative for nausea, vomiting  GENITOURINARY:  negative for frequency, urinary incontinence  HEMATOLOGIC/LYMPHATIC:  negative for bleeding and petechiae  MUSCULOSKELETAL:  positive for  pain  NEUROLOGICAL:  negative for headaches, dizziness  BEHAVIOR/PSYCH:  negative for increased agitation and anxiety    PHYSICAL EXAM:    VITALS:  BP (!) 145/83   Pulse (!) 102   Temp 97.4 °F (36.3 °C)   Resp 18   Wt 71.7 kg (158 lb)   SpO2 98%   BMI 20.85 kg/m²   CONSTITUTIONAL:  awake, alert, cooperative, no apparent distress, and appears stated age  MUSCULOSKELETAL:  Left upper Extremity:  Skin intact

## 2024-02-21 NOTE — PROGRESS NOTES
Patient was called - VM left 2/21/2024 - call office to schedule appt    ED / CC Chart - 2/20/2024 - Closed Boxer's Fracture - needs cast

## 2024-02-21 NOTE — DISCHARGE INSTRUCTIONS
Nonweightbearing to left upper extremity  Perform rest, ice, compression and elevation  Do not remove splint and avoid getting wet.  Call orthopedic surgeon listed for an appointment to be seen within 1 week, make them aware you were seen in the emergency department and have a left hand boxer's fracture

## 2024-02-27 ENCOUNTER — PREP FOR PROCEDURE (OUTPATIENT)
Dept: ORTHOPEDIC SURGERY | Age: 39
End: 2024-02-27

## 2024-02-27 ENCOUNTER — OFFICE VISIT (OUTPATIENT)
Dept: ORTHOPEDIC SURGERY | Age: 39
End: 2024-02-27
Payer: COMMERCIAL

## 2024-02-27 DIAGNOSIS — S62.339A CLOSED BOXER'S FRACTURE, INITIAL ENCOUNTER: Primary | ICD-10-CM

## 2024-02-27 PROBLEM — S62.307A CLOSED FRACTURE OF FIFTH METACARPAL BONE OF LEFT HAND: Status: ACTIVE | Noted: 2024-02-27

## 2024-02-27 PROCEDURE — G8420 CALC BMI NORM PARAMETERS: HCPCS | Performed by: NURSE PRACTITIONER

## 2024-02-27 PROCEDURE — G8427 DOCREV CUR MEDS BY ELIG CLIN: HCPCS | Performed by: NURSE PRACTITIONER

## 2024-02-27 PROCEDURE — 4004F PT TOBACCO SCREEN RCVD TLK: CPT | Performed by: NURSE PRACTITIONER

## 2024-02-27 PROCEDURE — G8484 FLU IMMUNIZE NO ADMIN: HCPCS | Performed by: NURSE PRACTITIONER

## 2024-02-27 PROCEDURE — 99204 OFFICE O/P NEW MOD 45 MIN: CPT | Performed by: NURSE PRACTITIONER

## 2024-02-27 NOTE — PROGRESS NOTES
[facility]  ORTHOPAEDICS AND SPORTS MEDICINE  DATE OF VISIT: 02/27/24  New Wrist/Hand Patient Visit     Referring Provider:   2/20/2024   [facility] Emergency Department        CHIEF COMPLAINT:   Chief Complaint   Patient presents with    ED Follow-up     2/20 L boxers fx. Pt states that he punched is fridge on 2/14, did not go to ED right away because he did not think it was broken. Went 1 week later because he had increased swelling. Pt states he has stayed in his splint.         HPI:      Mega Grove is a 38 y.o. year old male who is seen today  for evaluation of left fifth metacarpal fracture.  Patient reports on 2/14/2024 he punched a refrigerator.  He presented to the emergency department on 2/20/2024 and had x-rays obtained showing a fracture to his left fifth metacarpal.  Wearing an Ace bandage and partial splint.  He reports pain is mild has but however has noticed limitations in his range of motion.  He is right-hand dominant.  He is currently working.  He reports compliance with current restrictions.      PAST MEDICAL HISTORY  No past medical history on file.    PAST SURGICAL HISTORY  Past Surgical History:   Procedure Laterality Date    EYE SURGERY      LT    HEMORRHOID SURGERY         FAMILY HISTORY   No family history on file.    SOCIAL HISTORY  Social History     Occupational History    Not on file   Tobacco Use    Smoking status: Every Day     Current packs/day: 1.00     Types: Cigarettes    Smokeless tobacco: Never   Substance and Sexual Activity    Alcohol use: Yes     Comment: occ    Drug use: Yes     Types: Marijuana (Weed)    Sexual activity: Not on file       CURRENT MEDICATIONS     Current Outpatient Medications:     ibuprofen (IBU) 800 MG tablet, Take 1 tablet by mouth every 8 hours as needed for Pain, Disp: 21 tablet, Rfl: 0    lidocaine viscous hcl (XYLOCAINE) 2 % SOLN solution, Take 15 mLs by mouth every 3 hours as needed for Dental Pain or Pain, Disp:

## 2024-02-27 NOTE — PATIENT INSTRUCTIONS
Procedure : Left Boxer's Fracture Open reduction internal fixation with IM screw (Synthes)    Your surgery is scheduled for 3/5/2024 at 8:00 a.m. with Dr. Tate Armas DO at the main [place] [facility] in [place] .  You will need to report to Preop area  that morning at 6:00 a.m. .   All surgery start times are subject to change. You will be notified by office and/or PAT department if your surgery time changes. If you need to cancel/reschedule your surgery for any reason, please contact [facility] at [phone] ASA.   You are having Outpatient surgery, but plan for 1-2 nights in the hospital for recovery if needed.  Preadmission Testing (PAT) department at [facility] will contact you with further details regarding pre-operative blood work, where to park and enter the hospital, your medication list, etc. If you have any surgery related questions please contact PAT at [facility] at [phone].  Nothing to eat or drink after midnight the night before surgery.  You may take a pain pill and any other medicine PAT instructs you to take with small sip of water if needed.  Continue with ice and elevation to reduce swelling  No weight bearing left upper extremity, use assistive devices if needed (wheelchair, walker, crutches, cane, etc).  If you are taking Aspirin or Lovenox, hold the day of surgery. If taking Eliquis, hold this 48 hours prior to surgery.   Hold all NSAIDs (non-steroidal anti-inflammatories like Advil, motrin, ibuprofen, etc) and all Over the counter vitamins, minerals and herbal supplements for 7 days prior to surgery.- Last Dose 2/27/2024    Call office with any question or concerns: [phone]    All surgical patients will be gradually tapered off narcotic pain medication post operatively, this office will not continue narcotics longer than 6 weeks from date of surgery. If narcotics are required longer than this time period you will be referred to pain

## 2024-02-28 ENCOUNTER — TELEPHONE (OUTPATIENT)
Dept: ORTHOPEDIC SURGERY | Age: 39
End: 2024-02-28

## 2024-02-28 RX ORDER — ACETAMINOPHEN 500 MG
500 TABLET ORAL EVERY 6 HOURS PRN
COMMUNITY

## 2024-02-28 NOTE — PROGRESS NOTES
[facility]   PRE-ADMISSION TESTING GENERAL INSTRUCTIONS  PAT Phone Number: [phone]      GENERAL INSTRUCTIONS:    [x] Antibacterial Soap Shower Night before and/or AM of surgery.  [] CHG Wipes instruction sheet and wipes given.  [x] Do not wear makeup, lotions, powders, deodorant the morning of surgery.  [x] Nothing to eat or drink after midnight. This includes no gum, candy, mints or water.  [x] You may brush your teeth, gargle, but do not swallow water.   [x] No tobacco products, illegal drugs, or alcohol within 24 hours of your surgery.  [x] Jewelry or valuables should not be brought to the hospital. All body and/or tongue piercing's must be removed prior to arriving to hospital. No contact lens or hair pins.   [x] Arrange transportation with a responsible adult  to and from the hospital. Arrange for someone to be with you for the remainder of the day and for 24 hours after your procedure due to having had anesthesia.          -Who will be your  for transportation? cousin        -Who will be staying with you for 24 hrs after your procedure? cousin  [x] Bring insurance card and photo ID.  [] Bring copy of living will or healthcare power of  papers to be placed in your electronic record.  [] Urine Pregnancy test will be preformed the day of surgery. A specimen sample may be brought from home.  [] Transfusion (Green) Bracelet: Please bring with you to hospital, day of surgery.     PARKING INSTRUCTIONS:     [x] ARRIVAL DATE & TIME: 3/5 @ 0600   Times are subject to change. We will contact you the business day before surgery if that were to occur.  [x] Enter into the [facility] Entrance. Two people may accompany you. Masks are not required.  [x] Parking Lot \"I\" is where you will park. It is located on the corner of [address] and [address]. The entrance is on [address].   Only one vehicle - per patient, is permitted in parking lot.   Upon

## 2024-02-28 NOTE — TELEPHONE ENCOUNTER
Prior Authorization [facility]    Procedure:  ORIF Left 5th Metacarpal (Boxer's) Fracture with IM Screw (Synthes)  Procedure Code: 02891  Diagnosis Code:  S62.307A  Date:  3/5/2024  Facility:  [facility]   Status: OPT  Physician:  Tate Silveira D.O.  Call Reference Number:  [id number]  Auth Number: No prior auth required for CPT code 86057  Contact: Emily GUY

## 2024-03-05 ENCOUNTER — HOSPITAL ENCOUNTER (OUTPATIENT)
Age: 39
Setting detail: OUTPATIENT SURGERY
Discharge: HOME | End: 2024-03-05
Attending: STUDENT IN AN ORGANIZED HEALTH CARE EDUCATION/TRAINING PROGRAM | Admitting: STUDENT IN AN ORGANIZED HEALTH CARE EDUCATION/TRAINING PROGRAM
Payer: COMMERCIAL

## 2024-03-05 ENCOUNTER — ANESTHESIA (OUTPATIENT)
Dept: OPERATING ROOM | Age: 39
End: 2024-03-05
Payer: COMMERCIAL

## 2024-03-05 ENCOUNTER — HOSPITAL ENCOUNTER (OUTPATIENT)
Dept: GENERAL RADIOLOGY | Age: 39
Discharge: HOME | End: 2024-03-07

## 2024-03-05 ENCOUNTER — ANESTHESIA EVENT (OUTPATIENT)
Dept: OPERATING ROOM | Age: 39
End: 2024-03-05
Payer: COMMERCIAL

## 2024-03-05 VITALS
HEIGHT: 73 IN | BODY MASS INDEX: 21.2 KG/M2 | TEMPERATURE: 98 F | SYSTOLIC BLOOD PRESSURE: 135 MMHG | OXYGEN SATURATION: 100 % | HEART RATE: 72 BPM | DIASTOLIC BLOOD PRESSURE: 97 MMHG | RESPIRATION RATE: 13 BRPM | WEIGHT: 160 LBS

## 2024-03-05 DIAGNOSIS — S62.337D CLOSED DISPLACED FRACTURE OF NECK OF FIFTH METACARPAL BONE OF LEFT HAND WITH ROUTINE HEALING, SUBSEQUENT ENCOUNTER: Primary | ICD-10-CM

## 2024-03-05 DIAGNOSIS — T14.8XXA FRACTURE: ICD-10-CM

## 2024-03-05 PROCEDURE — 3600000007 HC SURGERY HYBRID BASE: Performed by: STUDENT IN AN ORGANIZED HEALTH CARE EDUCATION/TRAINING PROGRAM

## 2024-03-05 PROCEDURE — 6370000000 HC RX 637 (ALT 250 FOR IP): Performed by: STUDENT IN AN ORGANIZED HEALTH CARE EDUCATION/TRAINING PROGRAM

## 2024-03-05 PROCEDURE — 3600000017 HC SURGERY HYBRID ADDL 15MIN: Performed by: STUDENT IN AN ORGANIZED HEALTH CARE EDUCATION/TRAINING PROGRAM

## 2024-03-05 PROCEDURE — 2720000010 HC SURG SUPPLY STERILE: Performed by: STUDENT IN AN ORGANIZED HEALTH CARE EDUCATION/TRAINING PROGRAM

## 2024-03-05 PROCEDURE — 2580000003 HC RX 258: Performed by: STUDENT IN AN ORGANIZED HEALTH CARE EDUCATION/TRAINING PROGRAM

## 2024-03-05 PROCEDURE — 3700000001 HC ADD 15 MINUTES (ANESTHESIA): Performed by: STUDENT IN AN ORGANIZED HEALTH CARE EDUCATION/TRAINING PROGRAM

## 2024-03-05 PROCEDURE — 6360000002 HC RX W HCPCS: Performed by: STUDENT IN AN ORGANIZED HEALTH CARE EDUCATION/TRAINING PROGRAM

## 2024-03-05 PROCEDURE — 2709999900 HC NON-CHARGEABLE SUPPLY: Performed by: STUDENT IN AN ORGANIZED HEALTH CARE EDUCATION/TRAINING PROGRAM

## 2024-03-05 PROCEDURE — 2500000003 HC RX 250 WO HCPCS: Performed by: ANESTHESIOLOGY

## 2024-03-05 PROCEDURE — 6360000002 HC RX W HCPCS: Performed by: NURSE ANESTHETIST, CERTIFIED REGISTERED

## 2024-03-05 PROCEDURE — 7100000001 HC PACU RECOVERY - ADDTL 15 MIN: Performed by: STUDENT IN AN ORGANIZED HEALTH CARE EDUCATION/TRAINING PROGRAM

## 2024-03-05 PROCEDURE — 7100000000 HC PACU RECOVERY - FIRST 15 MIN: Performed by: STUDENT IN AN ORGANIZED HEALTH CARE EDUCATION/TRAINING PROGRAM

## 2024-03-05 PROCEDURE — 3700000000 HC ANESTHESIA ATTENDED CARE: Performed by: STUDENT IN AN ORGANIZED HEALTH CARE EDUCATION/TRAINING PROGRAM

## 2024-03-05 PROCEDURE — C1769 GUIDE WIRE: HCPCS | Performed by: STUDENT IN AN ORGANIZED HEALTH CARE EDUCATION/TRAINING PROGRAM

## 2024-03-05 PROCEDURE — 7100000010 HC PHASE II RECOVERY - FIRST 15 MIN: Performed by: STUDENT IN AN ORGANIZED HEALTH CARE EDUCATION/TRAINING PROGRAM

## 2024-03-05 PROCEDURE — 7100000011 HC PHASE II RECOVERY - ADDTL 15 MIN: Performed by: STUDENT IN AN ORGANIZED HEALTH CARE EDUCATION/TRAINING PROGRAM

## 2024-03-05 PROCEDURE — C1713 ANCHOR/SCREW BN/BN,TIS/BN: HCPCS | Performed by: STUDENT IN AN ORGANIZED HEALTH CARE EDUCATION/TRAINING PROGRAM

## 2024-03-05 PROCEDURE — 2500000003 HC RX 250 WO HCPCS

## 2024-03-05 DEVICE — IMPLANTABLE DEVICE: Type: IMPLANTABLE DEVICE | Site: HAND | Status: FUNCTIONAL

## 2024-03-05 RX ORDER — SODIUM CHLORIDE 9 MG/ML
INJECTION, SOLUTION INTRAVENOUS PRN
Status: DISCONTINUED | OUTPATIENT
Start: 2024-03-05 | End: 2024-03-05 | Stop reason: HOSPADM

## 2024-03-05 RX ORDER — KETOROLAC TROMETHAMINE 30 MG/ML
INJECTION, SOLUTION INTRAMUSCULAR; INTRAVENOUS PRN
Status: DISCONTINUED | OUTPATIENT
Start: 2024-03-05 | End: 2024-03-05 | Stop reason: SDUPTHER

## 2024-03-05 RX ORDER — MEPERIDINE HYDROCHLORIDE 25 MG/ML
12.5 INJECTION INTRAMUSCULAR; INTRAVENOUS; SUBCUTANEOUS EVERY 5 MIN PRN
Status: DISCONTINUED | OUTPATIENT
Start: 2024-03-05 | End: 2024-03-05 | Stop reason: HOSPADM

## 2024-03-05 RX ORDER — BACITRACIN ZINC 500 [USP'U]/G
OINTMENT TOPICAL PRN
Status: DISCONTINUED | OUTPATIENT
Start: 2024-03-05 | End: 2024-03-05 | Stop reason: ALTCHOICE

## 2024-03-05 RX ORDER — SODIUM CHLORIDE 9 MG/ML
INJECTION, SOLUTION INTRAVENOUS CONTINUOUS
Status: DISCONTINUED | OUTPATIENT
Start: 2024-03-05 | End: 2024-03-05 | Stop reason: HOSPADM

## 2024-03-05 RX ORDER — FENTANYL CITRATE 50 UG/ML
INJECTION, SOLUTION INTRAMUSCULAR; INTRAVENOUS PRN
Status: DISCONTINUED | OUTPATIENT
Start: 2024-03-05 | End: 2024-03-05 | Stop reason: SDUPTHER

## 2024-03-05 RX ORDER — LIDOCAINE HYDROCHLORIDE 20 MG/ML
INJECTION, SOLUTION INTRAVENOUS PRN
Status: DISCONTINUED | OUTPATIENT
Start: 2024-03-05 | End: 2024-03-05 | Stop reason: SDUPTHER

## 2024-03-05 RX ORDER — SODIUM CHLORIDE 0.9 % (FLUSH) 0.9 %
5-40 SYRINGE (ML) INJECTION PRN
Status: DISCONTINUED | OUTPATIENT
Start: 2024-03-05 | End: 2024-03-05 | Stop reason: HOSPADM

## 2024-03-05 RX ORDER — SODIUM CHLORIDE 0.9 % (FLUSH) 0.9 %
5-40 SYRINGE (ML) INJECTION EVERY 12 HOURS SCHEDULED
Status: DISCONTINUED | OUTPATIENT
Start: 2024-03-05 | End: 2024-03-05 | Stop reason: HOSPADM

## 2024-03-05 RX ORDER — PROPOFOL 10 MG/ML
INJECTION, EMULSION INTRAVENOUS PRN
Status: DISCONTINUED | OUTPATIENT
Start: 2024-03-05 | End: 2024-03-05 | Stop reason: SDUPTHER

## 2024-03-05 RX ORDER — BUPIVACAINE HYDROCHLORIDE 5 MG/ML
INJECTION, SOLUTION EPIDURAL; INTRACAUDAL PRN
Status: DISCONTINUED | OUTPATIENT
Start: 2024-03-05 | End: 2024-03-05 | Stop reason: ALTCHOICE

## 2024-03-05 RX ORDER — HYDROMORPHONE HYDROCHLORIDE 1 MG/ML
0.5 INJECTION, SOLUTION INTRAMUSCULAR; INTRAVENOUS; SUBCUTANEOUS EVERY 5 MIN PRN
Status: DISCONTINUED | OUTPATIENT
Start: 2024-03-05 | End: 2024-03-05 | Stop reason: HOSPADM

## 2024-03-05 RX ORDER — OXYCODONE HYDROCHLORIDE AND ACETAMINOPHEN 5; 325 MG/1; MG/1
1 TABLET ORAL
Status: COMPLETED | OUTPATIENT
Start: 2024-03-05 | End: 2024-03-05

## 2024-03-05 RX ORDER — OXYCODONE HYDROCHLORIDE AND ACETAMINOPHEN 5; 325 MG/1; MG/1
1 TABLET ORAL EVERY 6 HOURS PRN
Qty: 28 TABLET | Refills: 0 | Status: SHIPPED | OUTPATIENT
Start: 2024-03-05 | End: 2024-03-12

## 2024-03-05 RX ADMIN — SODIUM CHLORIDE: 9 INJECTION, SOLUTION INTRAVENOUS at 08:37

## 2024-03-05 RX ADMIN — HYDROMORPHONE HYDROCHLORIDE 0.5 MG: 1 INJECTION, SOLUTION INTRAMUSCULAR; INTRAVENOUS; SUBCUTANEOUS at 10:03

## 2024-03-05 RX ADMIN — HYDROMORPHONE HYDROCHLORIDE 0.5 MG: 1 INJECTION, SOLUTION INTRAMUSCULAR; INTRAVENOUS; SUBCUTANEOUS at 09:57

## 2024-03-05 RX ADMIN — LIDOCAINE HYDROCHLORIDE 60 MG: 20 INJECTION, SOLUTION INTRAVENOUS at 08:57

## 2024-03-05 RX ADMIN — KETOROLAC TROMETHAMINE 30 MG: 30 INJECTION, SOLUTION INTRAMUSCULAR at 09:25

## 2024-03-05 RX ADMIN — CEFAZOLIN 2000 MG: 2 INJECTION, POWDER, FOR SOLUTION INTRAMUSCULAR; INTRAVENOUS at 09:05

## 2024-03-05 RX ADMIN — FENTANYL CITRATE 100 MCG: 50 INJECTION, SOLUTION INTRAMUSCULAR; INTRAVENOUS at 08:57

## 2024-03-05 RX ADMIN — PROPOFOL 200 MG: 10 INJECTION, EMULSION INTRAVENOUS at 08:57

## 2024-03-05 RX ADMIN — OXYCODONE HYDROCHLORIDE AND ACETAMINOPHEN 1 TABLET: 5; 325 TABLET ORAL at 10:33

## 2024-03-05 ASSESSMENT — PAIN DESCRIPTION - PAIN TYPE
TYPE: SURGICAL PAIN

## 2024-03-05 ASSESSMENT — PAIN DESCRIPTION - DESCRIPTORS
DESCRIPTORS: ACHING;DISCOMFORT
DESCRIPTORS: ACHING
DESCRIPTORS: ACHING;DISCOMFORT

## 2024-03-05 ASSESSMENT — PAIN DESCRIPTION - LOCATION
LOCATION: HAND
LOCATION: HAND
LOCATION: HEAD
LOCATION: HAND
LOCATION: HAND

## 2024-03-05 ASSESSMENT — PAIN SCALES - GENERAL
PAINLEVEL_OUTOF10: 7
PAINLEVEL_OUTOF10: 4
PAINLEVEL_OUTOF10: 5
PAINLEVEL_OUTOF10: 5
PAINLEVEL_OUTOF10: 7

## 2024-03-05 ASSESSMENT — LIFESTYLE VARIABLES: SMOKING_STATUS: 1

## 2024-03-05 ASSESSMENT — PAIN DESCRIPTION - FREQUENCY
FREQUENCY: INTERMITTENT

## 2024-03-05 ASSESSMENT — PAIN - FUNCTIONAL ASSESSMENT
PAIN_FUNCTIONAL_ASSESSMENT: 0-10
PAIN_FUNCTIONAL_ASSESSMENT: NONE - DENIES PAIN

## 2024-03-05 ASSESSMENT — PAIN DESCRIPTION - ORIENTATION
ORIENTATION: LEFT

## 2024-03-05 NOTE — ANESTHESIA PRE PROCEDURE
Department of Anesthesiology  Preprocedure Note       Name:  Mega Grove   Age:  38 y.o.  :  1985                                          MRN:  [id number]         Date:  3/5/2024      Surgeon: Surgeon(s):  Tate Armas DO    Procedure: Procedure(s):  LEFT FIFTH METACARPAL FRACTURE OPEN REDUCTION INTERNAL FIXATION WITH INTRAMEDULLARY SCREW/ C-ARM    Medications prior to admission:   Prior to Admission medications    Medication Sig Start Date End Date Taking? Authorizing Provider   acetaminophen (TYLENOL) 500 MG tablet Take 1 tablet by mouth every 6 hours as needed for Pain   Yes Provider, MD Dae   ibuprofen (IBU) 800 MG tablet Take 1 tablet by mouth every 8 hours as needed for Pain 24  Sushma Lau APRN - CNP   lidocaine viscous hcl (XYLOCAINE) 2 % SOLN solution Take 15 mLs by mouth every 3 hours as needed for Dental Pain or Pain  Patient not taking: Reported on 2024   Sushma Lau APRN - CNP       Current medications:    Current Facility-Administered Medications   Medication Dose Route Frequency Provider Last Rate Last Admin   • 0.9 % sodium chloride infusion   IntraVENous Continuous Tate Armas DO       • sodium chloride flush 0.9 % injection 5-40 mL  5-40 mL IntraVENous 2 times per day Tate Armas DO       • sodium chloride flush 0.9 % injection 5-40 mL  5-40 mL IntraVENous PRN Tate Armas DO       • 0.9 % sodium chloride infusion   IntraVENous PRN Tate Armas DO       • ceFAZolin (ANCEF) 2,000 mg in sterile water 20 mL IV syringe  2,000 mg IntraVENous On Call to OR Tate Armas DO           Allergies:  No Known Allergies    Problem List:    Patient Active Problem List   Diagnosis Code   • Closed fracture of fifth metacarpal bone of left hand S62.307A       Past Medical History:  History reviewed. No pertinent past medical history.    Past Surgical History:        Procedure Laterality Date   • EYE SURGERY      LT   • HEMORRHOID SURGERY

## 2024-03-05 NOTE — OP NOTE
Operative Note      Patient: Mega Grove  YOB: 1985  MRN: [id number]    Date of Procedure: 3/5/2024    Pre-Op Diagnosis Codes:     * Closed fracture of fifth metacarpal bone of left hand [S62.307A]    Post-Op Diagnosis: Same       Procedure(s):  LEFT FIFTH METACARPAL FRACTURE OPEN REDUCTION INTERNAL FIXATION WITH INTRAMEDULLARY SCREW    Surgeon(s):  Tate Armas DO    Assistant:   Resident: Shreyas Fuller DO; Pj Watts DO; Damon Ordaz DO    Anesthesia: General    Estimated Blood Loss (mL): Minimal    Complications: None    Specimens:   * No specimens in log *    Implants:  Implant Name Type Inv. Item Serial No.  Lot No. LRB No. Used Action   SCREW BNE HDLSS LNG THRD 4X48 MM 19 MM YVETTE ST SD TI GRN NS - [id number]  SCREW BNE HDLSS LNG THRD 4X48 MM 19 MM YVETTE ST SD TI GRN NS  DEPU[employer]  Left 1 Implanted   GUIDEWIRE ORTH TRCR PT 1 END 1.4X150 MM STRL - [id number]  GUIDEWIRE ORTH TRCR PT 1 END 1.4X150 MM STRL  DEPUY [employer]NTH[employer]-WD  Left 1 Implanted         Drains: * No LDAs found *    Findings: See operative report below        Detailed Description of Procedure:   This is a 38-year-old male who punched a wall sustaining a left fifth metacarpal neck fracture about 60 degrees of angulation and rotational deformity.  He elected to proceed with operative intervention. Risks, benefits, and alternatives were discussed with the patient and their family. Risks include but are not limited to infection, blood loss, damage to neurovascular and musculoskeletal structures, malunion, nonunion, symptomatic hardware, need for further surgery, possible arthritis despite surgical stabilization, stiffness, and catastrophic anesthesia complications. They understand and wish to proceed.   Informed consent was obtained and signed and placed in the chart in preoperative holding.  My initials were placed on his left hand.  Patient was rolled to the operating room and transferred supine

## 2024-03-05 NOTE — H&P
fifth metacarpal neck fracture about 60 degrees angulated.      IMPRESSION/RECOMMENDATIONS:    38 y.o. year old male with left fifth metacarpal neck fracture, displaced  -A here today for outpatient open reduction internal excision left fifth metacarpal neck  -Risks, benefits, and alternatives were discussed with the patient and their family. Risks include but are not limited to infection, blood loss, damage to neurovascular and musculoskeletal structures, malunion, nonunion, symptomatic hardware, need for further surgery, possible arthritis despite surgical stabilization, stiffness, and catastrophic anesthesia complications. They understand and wish to proceed.   -Consent  -site marked  -Discharge home today after surgery range of motion as tolerated.  Nonweightbearing left upper extremity.  Follow-up in the office in 2 weeks              Tate Armas DO   Orthopaedic Surgery   3/5/2024  8:18 AM    Note: This report was completed using [employer] voiced recognition software.  Every effort has been made to ensure accuracy; however, inadvertent computerized transcription errors may be present.

## 2024-03-05 NOTE — DISCHARGE INSTRUCTIONS
[facility] Department of Orthopedic Surgery  [address]   [phone]   [place] [address]    Dr. Tate Armas, DO    Orthopaedics Discharge Instructions   Weight bearing Status - Non-weight bearing - on left upper Extremity, range of motion as tolerated  Pain medication Per Prescriptions  Contact Office for Medication Refill- [phone]  Office can refill pain med every 7 days  If patient discharging to facility then pain control will be continued per facility physician  Ice to operative/injured site for 15-30 minutes of each hour for next 5 days    Recommend that you continue to ice the area 2-3 times per day after this   Elevate operative/injured limb on 2 pillows at home  Goal is to have limb above the heart if able  Wound care -keep postoperative dressing in place clean and dry for 5 days.  After 5 days you can remove your dressing and shower over top of your incision.  Please cover your incisions with Band-Aids after showering    Follow Up in Office in 2 weeks. Your first post op appointment is often the PAs.     Call the office at [phone] directions or with any questions.  Watch for these significant complications.  Call physician if they or any other problems occur:  Fever over 101°, redness, swelling or warmth at the operative site  Unrelieved nausea    Foul smelling or cloudy drainage at the operative site   Unrelieved pain    Blood soaked dressing. (Some oozing may be normal)     Numb, pale, blue, cold or tingling extremity    Future Appointments   Date Time Provider Department Center   3/20/2024  1:50 PM Tate Armas DO [facility] ORTHO [facility]         It is the Department of Orthopaedic Trauma's standard of practice that providers will de-escalate(wean) all patients from narcotic(opioid) medications during the post-operative period.   We provide multimodal pain control but opioid medications are tapered in all of our patients.  If patient requires referral to pain management for

## 2024-03-05 NOTE — PROGRESS NOTES
Patient sitting up in bed taking PO. Belongings returned. Pharmacy will deliver patient's script. Patient's cousin, Ruby, called to transport home.

## 2024-03-05 NOTE — ANESTHESIA POSTPROCEDURE EVALUATION
Department of Anesthesiology  Postprocedure Note    Patient: Mega Grove  MRN: [id number]  YOB: 1985  Date of evaluation: 3/5/2024    Procedure Summary       Date: 03/05/24 Room / Location: [address]    Anesthesia Start:  Anesthesia Stop:     Procedure: LEFT FIFTH METACARPAL FRACTURE OPEN REDUCTION INTERNAL FIXATION WITH INTRAMEDULLARY SCREW/ C-ARM (Left: Hand) Diagnosis:       Closed fracture of fifth metacarpal bone of left hand      (Closed fracture of fifth metacarpal bone of left hand [S62.307A])    Surgeons: Tate Armas DO Responsible Provider:     Anesthesia Type: general ASA Status: 2            Anesthesia Type: No value filed.    Gage Phase I: Gage Score: 10    Gage Phase II: Gage Score: 10    Anesthesia Post Evaluation    Patient location during evaluation: PACU  Patient participation: complete - patient participated  Level of consciousness: awake  Pain score: 3  Airway patency: patent  Nausea & Vomiting: no nausea and no vomiting  Cardiovascular status: blood pressure returned to baseline  Respiratory status: acceptable  Hydration status: euvolemic      No notable events documented.

## 2024-03-05 NOTE — PROGRESS NOTES
CLINICAL PHARMACY NOTE: MEDS TO BEDS    Total # of Prescriptions Filled: 1   The following medications were delivered to the patient:  Percocet 5-325    Additional Documentation:   To PT

## 2024-03-05 NOTE — BRIEF OP NOTE
Brief Postoperative Note      Patient: Mega Grove  YOB: 1985  MRN: [id number]    Date of Procedure: 3/5/2024    Pre-Op Diagnosis Codes:     * Closed fracture of fifth metacarpal bone of left hand [S62.307A]    Post-Op Diagnosis: Same       Procedure(s):  LEFT FIFTH METACARPAL FRACTURE OPEN REDUCTION INTERNAL FIXATION WITH INTRAMEDULLARY SCREW    Surgeon(s):  Tate Armas DO    Assistant:  Resident: Shreyas Fuller DO; Pj Watts DO; Damon Ordaz DO    Anesthesia: General    Estimated Blood Loss (mL): Minimal    Complications: None    Specimens:   * No specimens in log *    Implants:  Implant Name Type Inv. Item Serial No.  Lot No. LRB No. Used Action   SCREW BNE HDLSS LNG THRD 4X48 MM 19 MM YVETTE ST SD TI GRN NS - [id number]  SCREW BNE HDLSS LNG THRD 4X48 MM 19 MM YVETTE ST SD TI GRN NS  DEPUY [employer]-WD  Left 1 Implanted   GUIDEWIRE ORTH TRCR PT 1 END 1.4X150 MM STRL - [id number]  GUIDEWIRE ORTH TRCR PT 1 END 1.4X150 MM STRL  DEPUY SYNTHES [employer]-WD  Left 1 Implanted         Drains: * No LDAs found *    Findings: Open reduction internal fixation left fifth metacarpal neck fracture with intramedullary screw      Electronically signed by Tate Armas DO on 3/5/2024 at 9:29 AM

## 2024-03-11 ENCOUNTER — TELEPHONE (OUTPATIENT)
Dept: ORTHOPEDIC SURGERY | Age: 39
End: 2024-03-11

## 2024-03-11 NOTE — TELEPHONE ENCOUNTER
ORIF Left 5th Metacarpal 3/5/24  Patient called questioning bandages.     Advised POD 5 he can remove bandage, can shower/wash (no soaking) hand. Pat dry. Place band aid over incision

## 2024-03-20 ENCOUNTER — OFFICE VISIT (OUTPATIENT)
Dept: ORTHOPEDIC SURGERY | Age: 39
End: 2024-03-20

## 2024-03-20 DIAGNOSIS — S62.339A CLOSED BOXER'S FRACTURE, INITIAL ENCOUNTER: Primary | ICD-10-CM

## 2024-03-20 PROCEDURE — 99024 POSTOP FOLLOW-UP VISIT: CPT | Performed by: STUDENT IN AN ORGANIZED HEALTH CARE EDUCATION/TRAINING PROGRAM

## 2024-03-20 NOTE — PROGRESS NOTES
Follow Up Post Operative Visit     Surgery: Open reduction internal fixation left fifth metacarpal neck fracture with intramedullary screw  Date: 3/5/2024    Subjective:    Mega Grove is here for follow up visit s/p above procedure.  He is doing well.  He has been compliant with his nonweightbearing.  He has been in a soft dressing but has not had to move his finger.  His pain is well-controlled he is happy with his results.  He is complaining of some decreased range of motion in his fifth MCP joint on the left    Controlled Substances Monitoring:        Physical Exam:    No data recorded    General: Alert and oriented x3, no acute distress  Cardiovascular/pulmonary: No labored breathing, peripheral perfusion intact  Musculoskeletal:    Left hand: Sutures well-approximated without drainage or signs of infection.  Sutures removed.  He has full range of motion of his DIP and PIP joint of his left small finger but a contracture of his MCP joint.  He can achieve full extension but has only about 30 degrees of flexion.  He cannot make a full fist with the finger which is about 3 cm shy of his palm.  Passively I can get him closer to 45 degrees with a firm endpoint.  Sensation is intact in the radial and ulnar border of the small finger      Imaging: Multiple views of the left hand demonstrate well-placed hardware after open reduction internal fixation of fifth metacarpal neck fracture.  Anatomic alignment with evidence of healing noted    Assessment and Plan: 2-week status post open reduction internal fixation left fifth metacarpal neck fracture  -He is developing some postoperative adhesions in his extensor tendon and stiffness.  I like to get him started in occupational therapy to work on aggressive range of motion to prevent permanent contracture.  We discussed that  strength comes from the ulnar digits and he needs to really get working with therapy.  We will continue his no heavy lifting pushing or pulling

## 2024-03-27 ENCOUNTER — HOSPITAL ENCOUNTER (OUTPATIENT)
Dept: OCCUPATIONAL THERAPY | Age: 39
Setting detail: THERAPIES SERIES
Discharge: HOME | End: 2024-03-27
Payer: COMMERCIAL

## 2024-03-27 PROCEDURE — 97165 OT EVAL LOW COMPLEX 30 MIN: CPT

## 2024-03-27 PROCEDURE — 97110 THERAPEUTIC EXERCISES: CPT

## 2024-03-27 NOTE — PROGRESS NOTES
OCCUPATIONAL THERAPY INITIAL EVALUATION    [facility]  [address] [address]  Dept: [phone]  Loc: [phone]   [facility] OT fax [phone]    Date:  3/27/2024  Initial Evaluation Date: 3/27/24   Evaluating Therapist: Vickie Kwan OT    Patient Name:  Mega Grove    :  1985    Restrictions/Precautions:  NWB, Follow fifth metacarpal ORIF protocol, low fall risk  Diagnosis:  Closed boxer's fracture, initial encounter (S62.339A)        Date of Surgery/Injury: 3/5/24 sx (3 weeks post op)    Insurance/Certification information:  [employer] NAHEED (requires auth after 12th visit)  Eval ok. No auth required until after 12th visit  Then call [phone] Opt 3  20 visit per heaven yr   No copay No deduct  Plan of care signed (Y/N): N  Visit# / total visits:     Referring Practitioner:  Tate Armas DO    NPI: [id number]   Specific Practitioner Orders: MCP joint contracture. Work on range of motion and modalities   Per last physician visit note: We will continue his no heavy lifting pushing or pulling restrictions however he can work aggressive range of motion with therapy     Assessment of current deficits   [] Functional mobility  [x] ADLs  [x] Strength   [] Cognition   [] Functional transfers   [x] IADLs  [] Safety Awareness  [x] Endurance   [] Fine Motor Coordination  [] Balance  [] Vision/perception  [] Sensation    [x] Gross Motor Coordination [x] ROM  [x] Pain   [x] Edema    [x] Scar Adhesion/Skin Integrity     OT PLAN OF CARE   OT POC based on physician orders, patient diagnosis and results of clinical assessment    Frequency/Duration: Frequency/Duration 1-2x / week for 12 visits.   Certification period From: 3/27/24  To: 24    Specific OT Treatment to include:   [x] Instruction in HEP                   Modalities:  [x] Therapeutic Exercise        [x] Ultrasound               [] Electrical Stimulation/Attended  [x]

## 2024-04-01 ENCOUNTER — HOSPITAL ENCOUNTER (OUTPATIENT)
Dept: OCCUPATIONAL THERAPY | Age: 39
Setting detail: THERAPIES SERIES
Discharge: HOME | End: 2024-04-01
Payer: COMMERCIAL

## 2024-04-01 PROCEDURE — 97140 MANUAL THERAPY 1/> REGIONS: CPT

## 2024-04-01 PROCEDURE — 97530 THERAPEUTIC ACTIVITIES: CPT

## 2024-04-01 PROCEDURE — 97110 THERAPEUTIC EXERCISES: CPT

## 2024-04-01 NOTE — PROGRESS NOTES
Prep Survey      Responses   Facility patient discharged from?  [facility]   Is patient eligible?  Yes   Discharge diagnosis  CABGx5   Does the patient have one of the following disease processes/diagnoses(primary or secondary)?  Cardiothoracic surgery   Does the patient have Home health ordered?  No   Is there a DME ordered?  No   Prep survey completed?  Yes          Deb Chiu RN        
6/27/24     Specific OT Treatment to include:   [x] Instruction in HEP                   Modalities:  [x] Therapeutic Exercise                 [x] Ultrasound               [] Electrical Stimulation/Attended  [x] PROM/Stretching                    [x] Fluidotherapy          [x]  Paraffin                   [x] AAROM  [x] AROM                 [x] Iontophoresis:   [x] Tendon Glides                                               [] Neuromuscular Re-Ed            [x] ADL/IADL re-training    [x] Therapeutic Activity                  [x] Pain Management with/without modalities PRN                 [x] Manual Therapy                      [x] Splinting                                   [x] Scar Management                   []Joint Protection/Training  []Ergonomics                             [x] Joint Mobilization                      [] Adaptive Equipment Assessment/Training                             [x] Manual Edema Mobilization   [x] Myofascial Release                 [] Energy Conservation/Work Simplification  [x] GM/FM Coordination                [x] Safety retraining/education per  individual diagnosis/goals  [x] Desensitization        Patient Specific Goal: Be able to make a tight fist                             GOALS (Long term same as Short term):  1) Patient will demonstrate good understanding of home program (exercises/activities/diagnosis/prognosis/goals) with good accuracy.   2) Patient will demonstrate increased active/passive range of motion of their L wrist (extension) and L SF by atleast 10* for ADL/IADL completion.  3) Patient will demonstrate increased /pinch strength of at least 10 / 3-5 pinch pounds of their L hand.   4) Patient to report decreased pain in their affected L upper extremity from 4/10 to 2/10 or less with resistive functional use.   5) Patient to report 100% compliance with their splint wear, care, and precautions if needed.   6) Patient will be knowledgeable of edema control

## 2024-04-03 ENCOUNTER — HOSPITAL ENCOUNTER (OUTPATIENT)
Dept: OCCUPATIONAL THERAPY | Age: 39
Setting detail: THERAPIES SERIES
Discharge: HOME | End: 2024-04-03
Payer: COMMERCIAL

## 2024-04-03 PROCEDURE — 97140 MANUAL THERAPY 1/> REGIONS: CPT

## 2024-04-03 PROCEDURE — 97110 THERAPEUTIC EXERCISES: CPT

## 2024-04-03 NOTE — PROGRESS NOTES
OCCUPATIONAL THERAPY DAILY NOTE  Y [facility]  YZ OCCUPATIONAL THERAPY  [address] [facility] [address]  Dept: [phone]  Loc: [phone]   SEYZ MAIN OT fax [phone]      Date:  4/3/2024  Initial Evaluation Date: 3/27/24                                Evaluating Therapist: Vickie Kwan OT     Patient Name:  Mega Grove                       :  1985     Restrictions/Precautions:  NWB, Follow fifth metacarpal ORIF protocol, low fall risk  Diagnosis:  Closed boxer's fracture, initial encounter (S62.339A)                                                     Date of Surgery/Injury: 3/5/24 sx (3 weeks post op)     Insurance/Certification information:  [employer] NAHEED (requires auth after 12th visit)  Eval ok. No auth required until after 12th visit  Then call [phone] Opt 3  20 visit per heaven yr   No copay No deduct  Plan of care signed (Y/N): Y- Electronic  Visit# / total visits: 3 / 12     Referring Practitioner:  Tate Armas DO    NPI: [id number]   Specific Practitioner Orders: MCP joint contracture. Work on range of motion and modalities   Per last physician visit note: We will continue his no heavy lifting pushing or pulling restrictions however he can work aggressive range of motion with therapy      Assessment of current deficits   [] Functional mobility             [x] ADLs           [x] Strength                  [] Cognition   [] Functional transfers           [x] IADLs          [] Safety Awareness  [x] Endurance   [] Fine Motor Coordination    [] Balance      [] Vision/perception    [] Sensation     [x] Gross Motor Coordination [x] ROM           [x] Pain                        [x] Edema          [x] Scar Adhesion/Skin Integrity      OT PLAN OF CARE   OT POC based on physician orders, patient diagnosis and results of clinical assessment     Frequency/Duration: Frequency/Duration 1-2x / week for 12 visits.   Certification period From: 3/27/24  To:

## 2024-04-08 ENCOUNTER — HOSPITAL ENCOUNTER (OUTPATIENT)
Dept: OCCUPATIONAL THERAPY | Age: 39
Setting detail: THERAPIES SERIES
Discharge: HOME | End: 2024-04-08
Payer: COMMERCIAL

## 2024-04-08 PROCEDURE — 97110 THERAPEUTIC EXERCISES: CPT

## 2024-04-08 PROCEDURE — 97140 MANUAL THERAPY 1/> REGIONS: CPT

## 2024-04-08 PROCEDURE — 97022 WHIRLPOOL THERAPY: CPT

## 2024-04-08 NOTE — PROGRESS NOTES
OCCUPATIONAL THERAPY DAILY NOTE  Y [facility]  YZ OCCUPATIONAL THERAPY  [address] [facility] [address]  Dept: [phone]  Loc: [phone]   SEYZ MAIN OT fax [phone]      Date:  2024  Initial Evaluation Date: 3/27/24                                Evaluating Therapist: Vickie Kwan OT     Patient Name:  M[place] Grove                       :  1985     Restrictions/Precautions:  NWB, Follow fifth metacarpal ORIF protocol, low fall risk  Diagnosis:  Closed boxer's fracture, initial encounter (S62.339A)                                                     Date of Surgery/Injury: 3/5/24 sx (3 weeks post op)     Insurance/Certification information:  [employer] NAHEED (requires auth after 12th visit)  Eval ok. No auth required until after 12th visit  Then call [phone] Opt 3  20 visit per heaven yr  No copay No deduct    Plan of care signed (Y/N): Y- Electronic  Visit# / total visits:      Referring Practitioner:  Tate Armas DO    NPI: [id number]   Specific Practitioner Orders: MCP joint contracture. Work on range of motion and modalities   Per last physician visit note: We will continue his no heavy lifting pushing or pulling restrictions however he can work aggressive range of motion with therapy      Assessment of current deficits   [] Functional mobility             [x] ADLs           [x] Strength                  [] Cognition   [] Functional transfers           [x] IADLs          [] Safety Awareness  [x] Endurance   [] Fine Motor Coordination    [] Balance      [] Vision/perception    [] Sensation     [x] Gross Motor Coordination [x] ROM           [x] Pain                        [x] Edema          [x] Scar Adhesion/Skin Integrity      OT PLAN OF CARE   OT POC based on physician orders, patient diagnosis and results of clinical assessment     Frequency/Duration: Frequency/Duration 1-2x / week for 12 visits.   Certification period From: 3/27/24  To:

## 2024-04-10 ENCOUNTER — HOSPITAL ENCOUNTER (OUTPATIENT)
Dept: OCCUPATIONAL THERAPY | Age: 39
Setting detail: THERAPIES SERIES
Discharge: HOME | End: 2024-04-10
Payer: COMMERCIAL

## 2024-04-10 PROCEDURE — 97110 THERAPEUTIC EXERCISES: CPT

## 2024-04-10 PROCEDURE — 97022 WHIRLPOOL THERAPY: CPT

## 2024-04-10 PROCEDURE — 97140 MANUAL THERAPY 1/> REGIONS: CPT

## 2024-04-10 NOTE — PROGRESS NOTES
OCCUPATIONAL THERAPY DAILY NOTE  [facility]  YZ O[facility]UPATIONAL [facility]ERAPY  [address] [facility] [address]  Dept: [phone]  Loc: [phone]   SEYZ MAIN OT fax [phone]      Date:  4/10/2024  Initial Evaluation Date: 3/27/24                                Evaluating Therapist: Vickie Kwan OT     Patient Name:  M[place]                       :  1985     Restrictions/Precautions:  NWB, Follow fifth metacarpal ORIF protocol, low fall risk  Diagnosis:  Closed boxer's fracture, initial encounter (S62.339A)                                                     Date of Surgery/Injury: 3/5/24 sx (3 weeks post op)     Insurance/Certification information:  [employer] NAHEED (requires auth after 12th visit)  Eval ok. No auth required until after 12th visit  Then call [phone] Opt 3  20 visit per heaven yr  No copay No deduct    Plan of care signed (Y/N): Y- Electronic  Visit# / total visits:      Referring Practitioner:  Tate Armas DO    NPI: [id number]   Specific Practitioner Orders: MCP joint contracture. Work on range of motion and modalities   Per last physician visit note: We will continue his no heavy lifting pushing or pulling restrictions however he can work aggressive range of motion with therapy      Assessment of current deficits   [] Functional mobility             [x] ADLs           [x] Strength                  [] Cognition   [] Functional transfers           [x] IADLs          [] Safety Awareness  [x] Endurance   [] Fine Motor Coordination    [] Balance      [] Vision/perception    [] Sensation     [x] Gross Motor Coordination [x] ROM           [x] Pain                        [x] Edema          [x] Scar Adhesion/Skin Integrity      OT PLAN OF CARE   OT POC based on physician orders, patient diagnosis and results of clinical assessment     Frequency/Duration: Frequency/Duration 1-2x / week for 12 visits.   Certification period From: 3/27/24  To:

## 2024-04-15 ENCOUNTER — HOSPITAL ENCOUNTER (OUTPATIENT)
Dept: OCCUPATIONAL THERAPY | Age: 39
Setting detail: THERAPIES SERIES
Discharge: HOME | End: 2024-04-15
Payer: COMMERCIAL

## 2024-04-15 PROCEDURE — 97110 THERAPEUTIC EXERCISES: CPT

## 2024-04-15 PROCEDURE — 97022 WHIRLPOOL THERAPY: CPT

## 2024-04-15 PROCEDURE — 97140 MANUAL THERAPY 1/> REGIONS: CPT

## 2024-04-15 NOTE — PROGRESS NOTES
OCCUPATIONAL THERAPY DAILY NOTE  [facility]  YZ OCCUPATIONAL THERAPY  [address] [facility] [address]  Dept: [phone]  Loc: [phone]   SEYZ MAIN OT fax [phone]      Date:  4/15/2024  Initial Evaluation Date: 3/27/24                                Evaluating Therapist: Vickie Kwan OT     Patient Name:  M[place] Grove                       :  1985     Restrictions/Precautions:  NWB, Follow fifth metacarpal ORIF protocol, low fall risk  Diagnosis:  Closed boxer's fracture, initial encounter (S62.339A)                                                     Date of Surgery/Injury: 3/5/24 sx (3 weeks post op)     Insurance/Certification information:  [employer] NAHEED (requires auth after 12th visit)  Eval ok. No auth required until after 12th visit  Then call [phone] Opt 3  20 visit per heaven yr  No copay No deduct    Plan of care signed (Y/N): Y- Electronic  Visit# / total visits:      Referring Practitioner:  Tate Armas DO    NPI: [id number]   Specific Practitioner Orders: MCP joint contracture. Work on range of motion and modalities   Per last physician visit note: We will continue his no heavy lifting pushing or pulling restrictions however he can work aggressive range of motion with therapy      Assessment of current deficits   [] Functional mobility             [x] ADLs           [x] Strength                  [] Cognition   [] Functional transfers           [x] IADLs          [] Safety Awareness  [x] Endurance   [] Fine Motor Coordination    [] Balance      [] Vision/perception    [] Sensation     [x] Gross Motor Coordination [x] ROM           [x] Pain                        [x] Edema          [x] Scar Adhesion/Skin Integrity      OT PLAN OF CARE   OT POC based on physician orders, patient diagnosis and results of clinical assessment     Frequency/Duration: Frequency/Duration 1-2x / week for 12 visits.   Certification period From: 3/27/24  To:

## 2024-04-17 ENCOUNTER — OFFICE VISIT (OUTPATIENT)
Dept: ORTHOPEDIC SURGERY | Age: 39
End: 2024-04-17

## 2024-04-17 DIAGNOSIS — S62.339A CLOSED BOXER'S FRACTURE, INITIAL ENCOUNTER: Primary | ICD-10-CM

## 2024-04-17 PROCEDURE — 99024 POSTOP FOLLOW-UP VISIT: CPT | Performed by: STUDENT IN AN ORGANIZED HEALTH CARE EDUCATION/TRAINING PROGRAM

## 2024-04-17 NOTE — PROGRESS NOTES
Follow Up Post Operative Visit     Surgery: Open reduction internal fixation left fifth metacarpal neck fracture with intramedullary screw  Date: 3/5/2024    Subjective:    M[place] is here for follow up visit s/p above procedure.  He is doing well.  His pain is very well-controlled.  He has been participating occupational therapy and has achieved near normal range of motion.  He is happy with his results.    Controlled Substances Monitoring:        Physical Exam:    No data recorded    General: Alert and oriented x3, no acute distress  Cardiovascular/pulmonary: No labored breathing, peripheral perfusion intact  Musculoskeletal:    Left hand: .  He has full range of motion of his DIP and PIP joint of his left small finger but a contracture of his MCP joint.  He can achieve full extension but has only about 30 degrees of flexion.  He is now able to flex to about 80 degrees at his MCP joint and his finger is about 1 cm from his palm..  Sensation is intact in the radial and ulnar border of the small finger      Imaging: Multiple views of the left hand demonstrate well-placed hardware after open reduction internal fixation of fifth metacarpal neck fracture.  Anatomic alignment with evidence of healing noted    Assessment and Plan: 6-week status post open reduction internal fixation left fifth metacarpal neck fracture  -He has progressed very well with therapy.  He has a few degrees of flexion to achieve.  His hand is not keeping him from any of his activities daily living.  He should finish occupational therapy.  I will see him back in 6 weeks for final x-ray.  I would still recommend no heavy lifting pushing or pulling with that hand however he can use it for normal activities of daily living.                Tate Armas DO   Orthopaedic Surgery   4/17/24  2:30 PM    Note: This report was completed using Blue[employer] voiced recognition software.  Every effort has been made to ensure accuracy; however, inadvertent

## 2024-04-18 ENCOUNTER — HOSPITAL ENCOUNTER (OUTPATIENT)
Dept: OCCUPATIONAL THERAPY | Age: 39
Setting detail: THERAPIES SERIES
Discharge: HOME | End: 2024-04-18
Payer: COMMERCIAL

## 2024-04-18 PROCEDURE — 97022 WHIRLPOOL THERAPY: CPT

## 2024-04-18 PROCEDURE — 97140 MANUAL THERAPY 1/> REGIONS: CPT

## 2024-04-18 PROCEDURE — 97110 THERAPEUTIC EXERCISES: CPT

## 2024-04-18 NOTE — PROGRESS NOTES
2/10 or less with resistive functional use.    Goal Reached: 0/10    5) Patient to report 100% compliance with their splint wear, care, and precautions if needed.    Goal Reached:  splint discharged    6) Patient will be knowledgeable of edema control techniques as evident with decreases from moderate to mild/none.    Goal Reached    7) Patient will demonstrate a non-tender/non-adherent scar.    Progressing: program in place, demonstrated good tech    8) Patient will report ADL/IADL functions as Mod I/I using LUE and no reported difficulty.    Goal Progressing: Independent with all ADL/IADL except orders for no heavy lifting     9) Patient will demonstrate improved functional activity tolerance from Fair to Good for ADL/IADL completion.   Goal Reached    10) Patient will decrease QuickDASH score to 20% or less for increased participation in daily functional activities.    TBA    TODAY'S TREATMENT     Pain Level: Pt reports no pain at rest, 1-2/10 pain in MCP and dorsal/ulnar hand with passive range, less tight    Subjective: Seen for scheduled visits. Reports no pain at this time just if he is massage it and it might be point tender.      Objective:  Week 6      Updated POC to be completed by 12th visit. 4/18/24,   INTERVENTION: COMPLETED: SPECIFICS/COMMENTS:   Modality:     Moist Heat  -To L hand to reduce pain and promote soft tissue extensibility x 13 min   Fluidotherapy x *Affected UE: Completes x 15 min to promote tissue healing, skin desensitization, reduce inflammation, and decrease pain. Actively completed SROM in all available planes with holds at end range during treatment to facilitate improved functional ROM.    AROM:     L SF     X   X   X   X   X   X    -Tendon glides x 10  -Blocked MCP/PIP/DIP flex/ext x 15 ea  -Reverse digit blocking to ^ PIP ext x 20  --Tendon glides  -Composite fist x 20 using sponge wedges  -Towel scrunch x 12  Lift and hold into extension  Lift and hold mp into extenion

## 2024-04-22 ENCOUNTER — HOSPITAL ENCOUNTER (OUTPATIENT)
Dept: OCCUPATIONAL THERAPY | Age: 39
Setting detail: THERAPIES SERIES
Discharge: HOME | End: 2024-04-22
Payer: COMMERCIAL

## 2024-04-22 NOTE — PROGRESS NOTES
[facility]  SEYZ OCCUPATIONAL THERAPY  [address] [facility] [address]  Dept: [phone]  Loc: [phone]   SEYZ MAIN OT fax [phone]    Cancellation/No Show Note      Date:  2024    Patient Name:  Mega Grove     :  1985         PT ID: [id number]    Total missed visits including today: 1       Total number of no shows: 0    For today's appointment patient:   [x]  Cancelled   []  Rescheduled appointment   []  No-show     Reason given by patient:   []  Patient ill   []  Conflicting appointment   []  No transportation   []  Conflict with work   [x]  No reason given   []  Other:    Comments:                    Comments:      Electronically signed by:  PEE Yadav,

## 2024-04-24 ENCOUNTER — HOSPITAL ENCOUNTER (OUTPATIENT)
Dept: OCCUPATIONAL THERAPY | Age: 39
Setting detail: THERAPIES SERIES
Discharge: HOME | End: 2024-04-24
Payer: COMMERCIAL

## 2024-04-24 NOTE — PROGRESS NOTES
[facility]  SEYZ OCCUPATIONAL THERAPY  [address] [facility] [address]  Dept: [phone]  Loc: [phone]   SEYZ MAIN OT fax [phone]    Cancellation/No Show Note      Date:  2024    Patient Name:  M[place] Grove     :  1985         PT ID: [id number]    Total missed visits including today: 2       Total number of no shows: 0    For today's appointment patient:   [x]  Cancelled   []  Rescheduled appointment   []  No-show     Reason given by patient:   []  Patient ill   []  Conflicting appointment   []  No transportation   [x]  Conflict with work   []  No reason given   []  Other:    Comments:                    Comments:  Can not leave work.    Electronically signed by:  PEE Yadav,

## 2024-04-29 ENCOUNTER — HOSPITAL ENCOUNTER (OUTPATIENT)
Dept: OCCUPATIONAL THERAPY | Age: 39
Setting detail: THERAPIES SERIES
Discharge: HOME | End: 2024-04-29
Payer: COMMERCIAL

## 2024-04-29 PROCEDURE — 97530 THERAPEUTIC ACTIVITIES: CPT

## 2024-04-29 PROCEDURE — 97140 MANUAL THERAPY 1/> REGIONS: CPT

## 2024-04-29 PROCEDURE — 97110 THERAPEUTIC EXERCISES: CPT

## 2024-04-29 NOTE — PROGRESS NOTES
OCCUPATIONAL THERAPY DAILY NOTE      [facility]  YZ [facility]UPATIONAL [facility]ERAPY  [address] [facility] [address]  Dept: [phone]  Loc: [phone]   SEYZ MAIN OT fax [phone]      Date:  2024  Initial Evaluation Date: 3/27/24                                Evaluating Therapist: Vickie Kwan OT     Patient Name:  [place]                       :  1985     Restrictions/Precautions:  NWB, Follow fifth metacarpal ORIF protocol, low fall risk  Diagnosis:  Closed boxer's fracture, initial encounter (S62.339A)                                                     Date of Surgery/Injury: 3/5/24 sx (3 weeks post op)     Insurance/Certification information:  [employer] NAHEED (requires auth after 12th visit)  Eval ok. No auth required until after 12th visit  Then call [phone] Opt 3  20 visit per heaven yr  No copay No deduct    Plan of care signed (Y/N): Y- Electronic  Visit# / total visits: 7 / 10-     Referring Practitioner:  Tate Armas DO    NPI: [id number]   Specific Practitioner Orders: MCP joint contracture. Work on range of motion and modalities   Per last physician visit note: We will continue his no heavy lifting pushing or pulling restrictions however he can work aggressive range of motion with therapy and return to normal ADLs.  Ok to start strengthening gradually. 24.     Assessment of current deficits   [] Functional mobility             [x] ADLs           [x] Strength                  [] Cognition   [] Functional transfers           [x] IADLs          [] Safety Awareness  [x] Endurance   [] Fine Motor Coordination    [] Balance      [] Vision/perception    [] Sensation     [x] Gross Motor Coordination [x] ROM           [x] Pain                        [x] Edema          [x] Scar Adhesion/Skin Integrity      OT PLAN OF CARE   OT POC based on physician orders, patient diagnosis and results of clinical assessment  Frequency/Duration:

## 2024-05-01 ENCOUNTER — HOSPITAL ENCOUNTER (OUTPATIENT)
Dept: OCCUPATIONAL THERAPY | Age: 39
Setting detail: THERAPIES SERIES
Discharge: HOME | End: 2024-05-01
Payer: COMMERCIAL

## 2024-05-01 PROCEDURE — 97110 THERAPEUTIC EXERCISES: CPT

## 2024-05-01 PROCEDURE — 97140 MANUAL THERAPY 1/> REGIONS: CPT

## 2024-05-01 PROCEDURE — 97530 THERAPEUTIC ACTIVITIES: CPT

## 2024-05-01 NOTE — PROGRESS NOTES
OCCUPATIONAL THERAPY DAILY NOTE      [facility]ERAP[facility]  [address] [facility] [address]  Dept: [phone]  Loc: [phone]   SEYZ MAIN OT fax [phone]      Date:  2024  Initial Evaluation Date: 3/27/24                                Evaluating Therapist: Vickie Kwan OT     Patient Name:  [place]                       :  1985     Restrictions/Precautions:  NWB, Follow fifth metacarpal ORIF protocol, low fall risk  Diagnosis:  Closed boxer's fracture, initial encounter (S62.339A)                                                     Date of Surgery/Injury: 3/5/24 sx (3 weeks post op)     Insurance/Certification information:  [employer] NAHEED (requires auth after 12th visit)  Eval ok. No auth required until after 12th visit  Then call [phone] Opt 3  20 visit per heaven yr  No copay No deduct    Plan of care signed (Y/N): Y- Electronic  Visit# / total visits: 8 / 10-     Referring Practitioner:  Tate Armas DO    NPI: [id number]   Specific Practitioner Orders: MCP joint contracture. Work on range of motion and modalities   Per last physician visit note: We will continue his no heavy lifting pushing or pulling restrictions however he can work aggressive range of motion with therapy and return to normal ADLs.  Ok to start strengthening gradually. 24.     Assessment of current deficits   [] Functional mobility             [x] ADLs           [x] Strength                  [] Cognition   [] Functional transfers           [x] IADLs          [] Safety Awareness  [x] Endurance   [] Fine Motor Coordination    [] Balance      [] Vision/perception    [] Sensation     [x] Gross Motor Coordination [x] ROM           [x] Pain                        [x] Edema          [x] Scar Adhesion/Skin Integrity      OT PLAN OF CARE   OT POC based on physician orders, patient diagnosis and results of clinical assessment  Frequency/Duration:

## 2024-05-06 ENCOUNTER — HOSPITAL ENCOUNTER (OUTPATIENT)
Dept: OCCUPATIONAL THERAPY | Age: 39
Setting detail: THERAPIES SERIES
Discharge: HOME | End: 2024-05-06
Payer: COMMERCIAL

## 2024-05-06 PROCEDURE — 97110 THERAPEUTIC EXERCISES: CPT

## 2024-05-06 PROCEDURE — 97140 MANUAL THERAPY 1/> REGIONS: CPT

## 2024-05-06 PROCEDURE — 97530 THERAPEUTIC ACTIVITIES: CPT

## 2024-05-06 NOTE — PROGRESS NOTES
achieve ~ MCP flexion with place and hold x 12        PROM/Stretching:     L SF  X     X       -Blocked MCP/PIP/DIP flex/ext then composite with emphasis on MCP flex  -Knuckle roll into padded table x 10 with 10 sec holds to ^ MCP flex using sponge wedges    -Knuckle roll into putty x 5, 10 sec hold, added to HEP        Scar Mass/Edema Control:     Scar mobilization X  -To distal incision site, proximal site not fully closed   Soft tissue and edema mobilization X  -Soft tissue and edema mobilization x 10 min   Strengthening:     MCP x Resistive putty push into flexion  Resistive  and pull ex  Resistive pinch - med putty     Gripper x 35# gripper 25 x 3   5th Digit x Putty pull/push with individual digits in light putty 20 reps        Digit extension x Light rubber band for extension ex 10 x 3        Other:     HEP  -Review tendon glides provided with on IE  -Review reverse digit blocking provided on IE  -Add scar massage to HEP x 5 min, 3-4x/day   Splint/Therapeutic Activity  -Splint Discharged 4/17/224       Assessment/Comments: Patient is making gains towards stated goals.  They need to aggressively work on scars and stretch MCP to reach maximum potential at this time.   Patient reports improved functional use with all ADL/IADL tasks at this time, cc is weakness and  strength, stated he feels unsafe because his  will loosen. Scar management with intense scar management  with vibration continued today. Patient demonstrated improved A/PROM of PIP movement today, states he has been working it a lot.        Assessment:   Initial     4/18/24  5 Digit MCP E/F: 0/90*-100* 0*/41* 0/76  (0/90)        PIP E/F: 0/100* -14*/80* WNL        DIP E/F: 0/70*-90* 0*/66*  WNL             Dynamometer (setting 2) IE 4/18/24      Left  Deferred d/t NWB 55#      Right  Deferred  105#     Pinch (lateral)         Left  Deferred  15#     Right Deferred   20#     Pinch (tripod)         Left  Deferred  15#     Right  Deferred

## 2024-05-08 ENCOUNTER — HOSPITAL ENCOUNTER (OUTPATIENT)
Dept: OCCUPATIONAL THERAPY | Age: 39
Setting detail: THERAPIES SERIES
Discharge: HOME | End: 2024-05-08
Payer: COMMERCIAL

## 2024-05-08 PROCEDURE — 97140 MANUAL THERAPY 1/> REGIONS: CPT

## 2024-05-08 PROCEDURE — 97530 THERAPEUTIC ACTIVITIES: CPT

## 2024-05-08 PROCEDURE — 97110 THERAPEUTIC EXERCISES: CPT

## 2024-05-08 NOTE — PROGRESS NOTES
0*/41* 0/76  (0/90)        PIP E/F: 0/100* -14*/80* WNL        DIP E/F: 0/70*-90* 0*/66*  WNL             Dynamometer (setting 2) IE 4/18/24      Left  Deferred d/t NWB 55#      Right  Deferred  105#     Pinch (lateral)         Left  Deferred  15#     Right Deferred   20#     Pinch (tripod)         Left  Deferred  15#     Right  Deferred  21#        9 Hole Peg test  IE       Left 26 sec  23 sec      Right  22 sec  20 sec        QuickDASH  IE       Disability   47.7%  TBA        -Rehab Potential: Good   -Patient Response to Treatment: Actively engaged in program    Patient. Education:  [x] Plans/Goals, Risks/Benefits discussed  [x] Home exercise program  Method of Education: [x] Verbal  [x] Demo  [x] Written  Comprehension of Education:  [x] Verbalizes understanding.  [x] Demonstrates understanding.  [x] Needs Review.  [] Demonstrates/verbalizes understanding of HEP/Ed previously given.    Time In: 2:00            Time Out: 3:00           CODE  Minutes  Units   67445 Fluidotherapy     50117 Paraffin     68195 Ultrasound     53525 Electrical Stim - Attended     67850 Iontophoresis     29469 Therapeutic Ex 35 2   28836 Therapeutic Activity 10 1   68037 Neuromuscular Re-Ed     65308 Manual Therapy 10 1   94396 ADL/COMP Tech Train     95549 Orthotic Management/Training      Other: Moist heat                 Total  55 4       Plan: OT  1-2x / week for 12 visits.   Certification period From: 3/27/24  To: 6/27/24     [x]  Continues Plan of care with focus on address pain, edema, scar mobilization, endurance, ROM, and eventually strength for enhanced participation in ADL/IADLs : Treatment covered based on POC and graduated to patient's progress.   Pt education continues at each visit to obtain maximum benefits from skilled OT intervention.  []  Alter Plan of care:   []  Discharge:          DILLON Yadav [id number]  Vibha Decker, GARY R/L #[id number], CKTP

## 2024-05-13 ENCOUNTER — HOSPITAL ENCOUNTER (OUTPATIENT)
Dept: OCCUPATIONAL THERAPY | Age: 39
Setting detail: THERAPIES SERIES
Discharge: HOME | End: 2024-05-13
Payer: COMMERCIAL

## 2024-05-13 PROCEDURE — 97530 THERAPEUTIC ACTIVITIES: CPT

## 2024-05-13 PROCEDURE — 97110 THERAPEUTIC EXERCISES: CPT

## 2024-05-13 PROCEDURE — 97140 MANUAL THERAPY 1/> REGIONS: CPT

## 2024-05-13 NOTE — PROGRESS NOTES
OCCUPATIONAL THERAPY DAILY NOTE      [facility]  YZ OCCUPATIONAL THERAPY  [address] [facility] [address]  Dept: [phone]  Loc: [phone]   SEYZ MAIN OT fax [phone]      Date:  2024  Initial Evaluation Date: 3/27/24                                Evaluating Therapist: Vickie Kwna OT/RHYS Decker OT    Patient Name:  [place]                       :  1985     Restrictions/Precautions:  NWB, Follow fifth metacarpal ORIF protocol, low fall risk  Diagnosis:  Closed boxer's fracture, initial encounter (S62.339A)                                                     Date of Surgery/Injury: 3/5/24 sx (3 weeks post op)     Insurance/Certification information:  [employer] NAHEED (requires auth after 12th visit)  Eval ok. No auth required until after 12th visit  Then call [phone] Opt 3  20 visit per heaven yr  No copay No deduct    Plan of care signed (Y/N): Y- Electronic  Visit# / total visits:      Referring Practitioner:  Tate Armas DO    NPI: [id number]   Specific Practitioner Orders: MCP joint contracture. Work on range of motion and modalities   Per last physician visit note: We will continue his no heavy lifting pushing or pulling restrictions however he can work aggressive range of motion with therapy and return to normal ADLs.  Ok to start strengthening gradually. 24.     Assessment of current deficits   [] Functional mobility             [x] ADLs           [x] Strength                  [] Cognition   [] Functional transfers           [x] IADLs          [] Safety Awareness  [x] Endurance   [] Fine Motor Coordination    [] Balance      [] Vision/perception    [] Sensation     [x] Gross Motor Coordination [x] ROM           [x] Pain                        [x] Edema          [x] Scar Adhesion/Skin Integrity      OT PLAN OF CARE   OT POC based on physician orders, patient diagnosis and results of clinical assessment  Frequency/Duration:

## 2024-05-15 ENCOUNTER — HOSPITAL ENCOUNTER (OUTPATIENT)
Dept: OCCUPATIONAL THERAPY | Age: 39
Setting detail: THERAPIES SERIES
Discharge: HOME | End: 2024-05-15
Payer: COMMERCIAL

## 2024-05-15 NOTE — PROGRESS NOTES
[facility]  SEYZ OCCUPATIONAL THERAPY  [address] [facility] [address]  Dept: [phone]  Loc: [phone]   SEYZ MAIN OT fax [phone]    Cancellation/No Show Note      Date:  5/15/2024    Patient Name:  M[place]     :  1985         PT ID: [id number]    Total missed visits including today: 3       Total number of no shows: 0    For today's appointment patient:   [x]  Cancelled   []  Rescheduled appointment   []  No-show     Reason given by patient:   []  Patient ill   []  Conflicting appointment   []  No transportation   [x]  Conflict with work   []  No reason given   []  Other:    Comments:                    Comments:  Can not leave work.    Electronically signed by:  PEE Yadav,

## 2024-05-20 ENCOUNTER — HOSPITAL ENCOUNTER (OUTPATIENT)
Dept: OCCUPATIONAL THERAPY | Age: 39
Setting detail: THERAPIES SERIES
Discharge: HOME | End: 2024-05-20
Payer: COMMERCIAL

## 2024-05-20 NOTE — PROGRESS NOTES
[facility]  [facility]  [address] [facility] [address]  Dept: [phone]  Loc: [phone]   [facility] fax [phone]    Cancellation/No Show Note      Date:  2024    Patient Name:  Mega Grove     :  1985         PT ID: [id number]    Total missed visits including today: 4       Total number of no shows: 0    For today's appointment patient:   [x]  Cancelled   []  Rescheduled appointment   []  No-show     Reason given by patient:   []  Patient ill   []  Conflicting appointment   []  No transportation   [x]  Conflict with work   []  No reason given   []  Other:    Comments:                    Comments: Requested discharge.    Electronically signed by:  PEE Yadav,

## 2024-05-20 NOTE — PROGRESS NOTES
[facility]     OCCUPATIONAL THERAPY PROGRESS NOTE    [facility]   OUTPATIENT REHABILITATION CENTER  [address], [place]   [address]   Phone: [phone]   Fax: [phone]     LETTER OF DISCHARGE NOTIFICATION    5/20/2024    Dear Dr. WILNER Armas, DO :    This is to inform you that, as per [facility] Occupational Therapy department policy, your patient, Mega Grove, [id number],   is as of today’s date being discharged from Occupational Therapy secondary to the following reasons:      1.  If an Outpatient Occupational Therapy patient misses three consecutive scheduled appointments without any prior notification, he or she will be discharged from Occupational Therapy services.  A new prescription will be necessary to resume Occupational Therapy.    2.  If a client is absent for 50% of scheduled visits during a one-month period, he or she will be discharged from Occupational Therapy services.  A new prescription will be necessary to resume Occupational Therapy.    3.  Patient was scheduled for his final visit and a planned Progress Update.  He called to cancel and requested discharge.  Please see final notes for status.  Patient did very well and met all goals.  Thank you for this referral.    If you have any questions, feel free to call us at Outpatient Rehab, [phone].    Thank you     PEE Yadav, CARRANZA/L  [id number]  Vibha Decker OT R/L #[id number], [facility]  Occupational Therapy Department  [facility]   Outpatient Rehab Services  [phone]  [phone] (FAX)      ______________________________  ___________  Physician      Date    I have read the above notification and understand this discharge of POC.     Please return via Fax: [phone]

## 2024-09-21 ENCOUNTER — HOSPITAL ENCOUNTER (EMERGENCY)
Age: 39
Discharge: HOME | End: 2024-09-21
Attending: EMERGENCY MEDICINE
Payer: COMMERCIAL

## 2024-09-21 VITALS
OXYGEN SATURATION: 98 % | RESPIRATION RATE: 16 BRPM | HEART RATE: 99 BPM | SYSTOLIC BLOOD PRESSURE: 117 MMHG | TEMPERATURE: 97.1 F | DIASTOLIC BLOOD PRESSURE: 75 MMHG

## 2024-09-21 DIAGNOSIS — L02.412 ABSCESS OF AXILLA, LEFT: Primary | ICD-10-CM

## 2024-09-21 PROCEDURE — 10060 I&D ABSCESS SIMPLE/SINGLE: CPT

## 2024-09-21 PROCEDURE — 2500000003 HC RX 250 WO HCPCS: Performed by: EMERGENCY MEDICINE

## 2024-09-21 PROCEDURE — 99283 EMERGENCY DEPT VISIT LOW MDM: CPT

## 2024-09-21 RX ORDER — LIDOCAINE HYDROCHLORIDE AND EPINEPHRINE 10; 10 MG/ML; UG/ML
20 INJECTION, SOLUTION INFILTRATION; PERINEURAL ONCE
Status: COMPLETED | OUTPATIENT
Start: 2024-09-21 | End: 2024-09-21

## 2024-09-21 RX ORDER — DOXYCYCLINE 100 MG/1
100 CAPSULE ORAL 2 TIMES DAILY
Qty: 10 CAPSULE | Refills: 0 | Status: SHIPPED | OUTPATIENT
Start: 2024-09-21 | End: 2024-09-26

## 2024-09-21 RX ADMIN — LIDOCAINE HYDROCHLORIDE,EPINEPHRINE BITARTRATE 20 ML: 10; .01 INJECTION, SOLUTION INFILTRATION; PERINEURAL at 11:58

## 2024-09-21 ASSESSMENT — ENCOUNTER SYMPTOMS
ABDOMINAL PAIN: 0
VOMITING: 0
DIARRHEA: 0
NAUSEA: 0
SHORTNESS OF BREATH: 0

## 2025-05-09 ENCOUNTER — APPOINTMENT (OUTPATIENT)
Dept: GENERAL RADIOLOGY | Age: 40
End: 2025-05-09
Payer: COMMERCIAL

## 2025-05-09 ENCOUNTER — HOSPITAL ENCOUNTER (EMERGENCY)
Age: 40
Discharge: HOME | End: 2025-05-09
Payer: COMMERCIAL

## 2025-05-09 VITALS
SYSTOLIC BLOOD PRESSURE: 119 MMHG | DIASTOLIC BLOOD PRESSURE: 73 MMHG | HEART RATE: 80 BPM | OXYGEN SATURATION: 98 % | TEMPERATURE: 97.9 F | RESPIRATION RATE: 17 BRPM

## 2025-05-09 DIAGNOSIS — R05.1 ACUTE COUGH: Primary | ICD-10-CM

## 2025-05-09 LAB
FLUAV RNA RESP QL NAA+PROBE: NOT DETECTED
FLUBV RNA RESP QL NAA+PROBE: NOT DETECTED
SARS-COV-2 RNA RESP QL NAA+PROBE: NOT DETECTED
SOURCE: NORMAL
SPECIMEN DESCRIPTION: NORMAL

## 2025-05-09 PROCEDURE — 6370000000 HC RX 637 (ALT 250 FOR IP)

## 2025-05-09 PROCEDURE — 99284 EMERGENCY DEPT VISIT MOD MDM: CPT

## 2025-05-09 PROCEDURE — 6360000002 HC RX W HCPCS

## 2025-05-09 PROCEDURE — 87636 SARSCOV2 & INF A&B AMP PRB: CPT

## 2025-05-09 PROCEDURE — 71046 X-RAY EXAM CHEST 2 VIEWS: CPT

## 2025-05-09 RX ORDER — CETIRIZINE HYDROCHLORIDE 10 MG/1
10 TABLET ORAL DAILY
Qty: 30 TABLET | Refills: 0 | Status: SHIPPED | OUTPATIENT
Start: 2025-05-09

## 2025-05-09 RX ORDER — PREDNISONE 10 MG/1
TABLET ORAL
Qty: 20 TABLET | Refills: 0 | Status: SHIPPED | OUTPATIENT
Start: 2025-05-09 | End: 2025-05-19

## 2025-05-09 RX ORDER — ALBUTEROL SULFATE 90 UG/1
2 INHALANT RESPIRATORY (INHALATION) 4 TIMES DAILY PRN
Qty: 18 G | Refills: 0 | Status: SHIPPED | OUTPATIENT
Start: 2025-05-09

## 2025-05-09 RX ORDER — DEXAMETHASONE SODIUM PHOSPHATE 10 MG/ML
10 INJECTION, SOLUTION INTRAMUSCULAR; INTRAVENOUS ONCE
Status: COMPLETED | OUTPATIENT
Start: 2025-05-09 | End: 2025-05-09

## 2025-05-09 RX ORDER — IPRATROPIUM BROMIDE AND ALBUTEROL SULFATE 2.5; .5 MG/3ML; MG/3ML
1 SOLUTION RESPIRATORY (INHALATION) ONCE
Status: COMPLETED | OUTPATIENT
Start: 2025-05-09 | End: 2025-05-09

## 2025-05-09 RX ORDER — BENZONATATE 100 MG/1
100 CAPSULE ORAL 3 TIMES DAILY PRN
Qty: 30 CAPSULE | Refills: 0 | Status: SHIPPED | OUTPATIENT
Start: 2025-05-09 | End: 2025-05-19

## 2025-05-09 RX ADMIN — IPRATROPIUM BROMIDE AND ALBUTEROL SULFATE 1 DOSE: .5; 3 SOLUTION RESPIRATORY (INHALATION) at 08:26

## 2025-05-09 RX ADMIN — DEXAMETHASONE SODIUM PHOSPHATE 10 MG: 10 INJECTION, SOLUTION INTRAMUSCULAR; INTRAVENOUS at 08:22

## 2025-05-09 ASSESSMENT — LIFESTYLE VARIABLES
HOW OFTEN DO YOU HAVE A DRINK CONTAINING ALCOHOL: NEVER
HOW MANY STANDARD DRINKS CONTAINING ALCOHOL DO YOU HAVE ON A TYPICAL DAY: PATIENT DOES NOT DRINK

## 2025-05-09 NOTE — DISCHARGE INSTRUCTIONS
- Take medications that were sent to your pharmacy as prescribed  - If you develop any new or concerning symptoms, return to the emergency room for reevaluation  - I would discuss respirator with someone at work.  See if it is working appropriately or see if you are able to get a new one.  -Symptoms may be related to allergies  -Please work on establish with a primary care provider.

## 2025-05-09 NOTE — ED PROVIDER NOTES
Independent GABBY Visit.      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 )    History of Present Illness        Mega Grove is a 40 y.o. old male who presents to the emergency department by private vehicle, with gradual onset of dry cough and chest tightness which began 4 day(s) prior to arrival.  The symptoms are associated with no additional symptoms as it relates to today's visit and there has been no abdominal pain, chest pain, decreased appetite, conjunctivitis, watery eyes, nausea, vomiting, diarrhea, dizziness, dysuria, urinary frequency, earache, fever, fatigue, headache, hoarseness, irritability, joint swelling, malaise, muscle aches, nasal congestion, runny nose, neck stiffness, rash, sore throat, scratchy throat, swollen glands, wheezing, loss of taste, or loss of smell.  He has no prior history of pneumonia or bronchitis in the past.  Since onset the symptoms have been intermittent and waxing and waning and mild-moderate in severity.  The symptoms are aggravated by nothing in particular and improve as the day goes on.  Patient states that he is a .  Recently started using a a new respirator and thinks symptoms may be related to respirator.  He is unsure if it is working appropriately.  He is a daily cigarette smoker stating that he smokes \"too much every day.\"  The patient has not received any COVID-19 vaccine.  On assessment, patient is in no acute distress, nontoxic-appearing, respirations are easy and nonlabored.  GCS is 15.  Moving all extremities without issue    ROS   Pertinent positives and negatives are stated within HPI, all other systems reviewed and are negative.    Past Medical

## 2025-08-13 ENCOUNTER — HOSPITAL ENCOUNTER (EMERGENCY)
Age: 40
Discharge: HOME | End: 2025-08-13
Payer: COMMERCIAL

## 2025-08-13 VITALS
SYSTOLIC BLOOD PRESSURE: 104 MMHG | BODY MASS INDEX: 21.11 KG/M2 | DIASTOLIC BLOOD PRESSURE: 70 MMHG | OXYGEN SATURATION: 98 % | HEART RATE: 95 BPM | TEMPERATURE: 98.1 F | WEIGHT: 160 LBS | RESPIRATION RATE: 17 BRPM

## 2025-08-13 DIAGNOSIS — R19.09 RIGHT GROIN MASS: Primary | ICD-10-CM

## 2025-08-13 PROCEDURE — 99283 EMERGENCY DEPT VISIT LOW MDM: CPT

## 2025-08-13 PROCEDURE — 6370000000 HC RX 637 (ALT 250 FOR IP)

## 2025-08-13 RX ORDER — SULFAMETHOXAZOLE AND TRIMETHOPRIM 800; 160 MG/1; MG/1
1 TABLET ORAL ONCE
Status: COMPLETED | OUTPATIENT
Start: 2025-08-13 | End: 2025-08-13

## 2025-08-13 RX ORDER — IBUPROFEN 600 MG/1
600 TABLET, FILM COATED ORAL 4 TIMES DAILY PRN
Qty: 40 TABLET | Refills: 0 | Status: SHIPPED | OUTPATIENT
Start: 2025-08-13

## 2025-08-13 RX ORDER — SULFAMETHOXAZOLE AND TRIMETHOPRIM 800; 160 MG/1; MG/1
1 TABLET ORAL 2 TIMES DAILY
Qty: 14 TABLET | Refills: 0 | Status: SHIPPED | OUTPATIENT
Start: 2025-08-13 | End: 2025-08-20

## 2025-08-13 RX ORDER — CEPHALEXIN 500 MG/1
500 CAPSULE ORAL 4 TIMES DAILY
Qty: 28 CAPSULE | Refills: 0 | Status: SHIPPED | OUTPATIENT
Start: 2025-08-13 | End: 2025-08-20

## 2025-08-13 RX ORDER — IBUPROFEN 400 MG/1
600 TABLET, FILM COATED ORAL ONCE
Status: COMPLETED | OUTPATIENT
Start: 2025-08-13 | End: 2025-08-13

## 2025-08-13 RX ORDER — HYDROCODONE BITARTRATE AND ACETAMINOPHEN 5; 325 MG/1; MG/1
1 TABLET ORAL ONCE
Status: COMPLETED | OUTPATIENT
Start: 2025-08-13 | End: 2025-08-13

## 2025-08-13 RX ORDER — HYDROCODONE BITARTRATE AND ACETAMINOPHEN 5; 325 MG/1; MG/1
1 TABLET ORAL EVERY 8 HOURS PRN
Qty: 9 TABLET | Refills: 0 | Status: SHIPPED | OUTPATIENT
Start: 2025-08-13 | End: 2025-08-16

## 2025-08-13 RX ADMIN — CEPHALEXIN 250 MG: 250 CAPSULE ORAL at 09:12

## 2025-08-13 RX ADMIN — HYDROCODONE BITARTRATE AND ACETAMINOPHEN 1 TABLET: 5; 325 TABLET ORAL at 09:12

## 2025-08-13 RX ADMIN — SULFAMETHOXAZOLE AND TRIMETHOPRIM 1 TABLET: 800; 160 TABLET ORAL at 09:12

## 2025-08-13 RX ADMIN — IBUPROFEN 600 MG: 400 TABLET, FILM COATED ORAL at 09:12

## (undated) DEVICE — Device

## (undated) DEVICE — TOWEL,OR,DSP,ST,BLUE,DLX,10/PK,8PK/CS: Brand: MEDLINE

## (undated) DEVICE — ELECTRODE PT RET AD L9FT HI MOIST COND ADH HYDRGEL CORDED

## (undated) DEVICE — GUIDEWIRE ORTHOPEDIC 0.8X100 MM TROCAR PT 1 END

## (undated) DEVICE — PADDING,UNDERCAST,COTTON, 4"X4YD STERILE: Brand: MEDLINE

## (undated) DEVICE — SURGICAL PROCEDURE PACK HND

## (undated) DEVICE — 4-PORT MANIFOLD: Brand: NEPTUNE 2

## (undated) DEVICE — UPPER EXTREMITY: Brand: MEDLINE INDUSTRIES, INC.

## (undated) DEVICE — PENCIL ES L3M BTTN SWCH HOLSTER W/ BLDE ELECTRD EDGE

## (undated) DEVICE — DOUBLE BASIN SET: Brand: MEDLINE INDUSTRIES, INC.

## (undated) DEVICE — DRAPE EQUIP CARM 72X42 IN RUBBER BND CLP

## (undated) DEVICE — ZIMMER® STERILE DISPOSABLE TOURNIQUET CUFF WITH PROTECTIVE SLEEVE AND PLC, DUAL PORT, SINGLE BLADDER, 18 IN. (46 CM)